# Patient Record
Sex: MALE | Race: OTHER | HISPANIC OR LATINO | Employment: FULL TIME | URBAN - METROPOLITAN AREA
[De-identification: names, ages, dates, MRNs, and addresses within clinical notes are randomized per-mention and may not be internally consistent; named-entity substitution may affect disease eponyms.]

---

## 2021-01-25 ENCOUNTER — HOSPITAL ENCOUNTER (EMERGENCY)
Facility: HOSPITAL | Age: 51
Discharge: HOME | End: 2021-01-25
Attending: EMERGENCY MEDICINE | Admitting: EMERGENCY MEDICINE
Payer: COMMERCIAL

## 2021-01-25 VITALS
HEART RATE: 79 BPM | DIASTOLIC BLOOD PRESSURE: 62 MMHG | OXYGEN SATURATION: 99 % | SYSTOLIC BLOOD PRESSURE: 132 MMHG | RESPIRATION RATE: 18 BRPM | TEMPERATURE: 97.4 F

## 2021-01-25 DIAGNOSIS — Z20.822 PERSON UNDER INVESTIGATION FOR COVID-19: Primary | ICD-10-CM

## 2021-01-25 LAB
FLUAV RNA RESP QL NAA+PROBE: NEGATIVE
FLUBV RNA RESP QL NAA+PROBE: NEGATIVE
RSV RNA RESP QL NAA+PROBE: NEGATIVE
SARS-COV-2 RNA RESP QL NAA+PROBE: POSITIVE

## 2021-01-25 PROCEDURE — 0241U HB NFCT DS VIR RESP RNA 4 TRGT: CPT | Performed by: PHYSICIAN ASSISTANT

## 2021-01-25 PROCEDURE — 99284 EMERGENCY DEPT VISIT MOD MDM: CPT | Performed by: PHYSICIAN ASSISTANT

## 2021-01-25 PROCEDURE — 99283 EMERGENCY DEPT VISIT LOW MDM: CPT

## 2021-01-25 NOTE — Clinical Note
Raegan Coyne was seen and treated in our emergency department on 1/25/2021  Diagnosis:     Shea Vazquez    He may return on this date: You may not return to work until you get your results and are cleared     If you have any questions or concerns, please don't hesitate to call        Xavier Cam PA-C    ______________________________           _______________          _______________  Hospital Representative                              Date                                Time

## 2021-01-25 NOTE — DISCHARGE INSTRUCTIONS
Return to the ER with any new or worsening of symptoms such as but not limited to difficulty breathing, dizziness, chest pain, persistent fevers, or any other concerning symptoms    You may take over the counter vitamins such as vitamin D, vitamin C, zinc, and multivitamins per instructions    You must self quarantine until you get your results and are cleared    Thank you for allowing us to be part of your care today

## 2021-01-25 NOTE — ED PROVIDER NOTES
History  Chief Complaint   Patient presents with    Medical Problem     Patient states they were exposed to someone that positive for COVID     Patient presents to the ER for evaluation following a COVID exposure  Patient states that this morning, someone that he lives with was notified that they tested positive for COVID  Patient states that he has some mild sinus congestion however denies any other symptoms  Denies any past medical history  Denies taking any medication PTA  Denies any fevers, cough, shortness of breath, chest pain, abdominal pain, nausea, vomiting, diarrhea, change in taste or smell, or any other concerning symptoms  None       No past medical history on file  No past surgical history on file  No family history on file  I have reviewed and agree with the history as documented  E-Cigarette/Vaping    E-Cigarette Use Never User      E-Cigarette/Vaping Substances     Social History     Tobacco Use    Smoking status: Never Smoker   Substance Use Topics    Alcohol use: Not on file    Drug use: Not on file       Review of Systems   Constitutional: Negative for chills and fever  HENT: Positive for congestion  Negative for sore throat  Respiratory: Negative for cough and shortness of breath  Cardiovascular: Negative for chest pain  Gastrointestinal: Negative for abdominal pain, diarrhea, nausea and vomiting  Genitourinary: Negative for dysuria  Musculoskeletal: Negative for back pain  Skin: Negative for rash  Neurological: Negative for headaches  All other systems reviewed and are negative  Physical Exam  Physical Exam  Constitutional:       General: He is not in acute distress  Appearance: He is well-developed  HENT:      Head: Normocephalic and atraumatic  Nose: Nose normal    Eyes:      Conjunctiva/sclera: Conjunctivae normal    Neck:      Musculoskeletal: Normal range of motion  Cardiovascular:      Rate and Rhythm: Normal rate  Pulmonary:      Effort: Pulmonary effort is normal  No respiratory distress  Breath sounds: Normal breath sounds  No stridor  No wheezing, rhonchi or rales  Musculoskeletal: Normal range of motion  Skin:     General: Skin is warm  Capillary Refill: Capillary refill takes less than 2 seconds  Neurological:      Mental Status: He is alert and oriented to person, place, and time  Vital Signs  ED Triage Vitals [01/25/21 1220]   Temperature Pulse Respirations Blood Pressure SpO2   (!) 97 4 °F (36 3 °C) 79 18 132/62 99 %      Temp Source Heart Rate Source Patient Position - Orthostatic VS BP Location FiO2 (%)   Tympanic Monitor Sitting Left arm --      Pain Score       --           Vitals:    01/25/21 1220   BP: 132/62   Pulse: 79   Patient Position - Orthostatic VS: Sitting         Visual Acuity      ED Medications  Medications - No data to display    Diagnostic Studies  Results Reviewed     Procedure Component Value Units Date/Time    COVID19, Influenza A/B, RSV PCR, [facility] [[id number]]  (Abnormal) Collected: 01/25/21 1254    Lab Status: Final result Specimen: Nares from Nasopharyngeal Swab Updated: 01/25/21 1412     SARS-CoV-2 Positive     INFLUENZA A PCR Negative     INFLUENZA B PCR Negative     RSV PCR Negative    Narrative: This test has been authorized by FDA under an EUA (Emergency Use Assay) for use by authorized laboratories  Clinical caution and judgement should be used with the interpretation of these results with consideration of the clinical impression and other laboratory testing  Testing reported as "Positive" or "Negative" has been proven to be accurate according to standard laboratory validation requirements  All testing is performed with control materials showing appropriate reactivity at standard intervals                   No orders to display              Procedures  Procedures         ED Course  ED Course as of Jan 25 1512   Mon Jan 25, 2021   1228 Blood Pressure: 132/62   1228 Temperature(!): 97 4 °F (36 3 °C)   1228 Pulse: 79   1228 Respirations: 18   1228 SpO2: 99 %                                           MDM     Patient well appearing and in no acute distress  Discussed with patient that he must quarantine until he gets his results and is cleared  Discussed symptomatic treatment and strict return instructions  Patient in no acute distress throughout ER stay  Vitals stable and reassuring  Patient stable for discharge at this time  Reviewed plan with patient/family  Reviewed red flag symptoms and strict return instructions  Patient/family voiced understanding and agreement to plan  Patient/family had opportunity to ask questions and all questions were answered at bedside  Disposition  Final diagnoses:   Person under investigation for COVID-19     Time reflects when diagnosis was documented in both MDM as applicable and the Disposition within this note     Time User Action Codes Description Comment    1/25/2021 12:47 PM Kevin Phan [[id number]] Person under investigation for COVID-19       ED Disposition     ED Disposition Condition Date/Time Comment    Discharge Stable Mon Jan 25, 2021 12:47 PM Dave Goodwin discharge to home/self care  Follow-up Information     Follow up With Specialties Details Why Contact Info Additional [address] Emergency Department Emergency Medicine  If symptoms worsen [address]  [address] [place] [address] Emergency Department, [address], [facility] [address]    [address]              There are no discharge medications for this patient  No discharge procedures on file      PDMP Review     None          ED Provider  Electronically Signed by           Zeenat Gonzalez PA-C  01/25/21 3640

## 2021-07-28 ENCOUNTER — HOSPITAL ENCOUNTER (EMERGENCY)
Facility: HOSPITAL | Age: 51
Discharge: HOME | End: 2021-07-28
Attending: EMERGENCY MEDICINE
Payer: COMMERCIAL

## 2021-07-28 VITALS
OXYGEN SATURATION: 97 % | SYSTOLIC BLOOD PRESSURE: 151 MMHG | WEIGHT: 230 LBS | DIASTOLIC BLOOD PRESSURE: 91 MMHG | TEMPERATURE: 98.6 F | RESPIRATION RATE: 18 BRPM | HEART RATE: 77 BPM

## 2021-07-28 DIAGNOSIS — M79.604 LOW BACK PAIN RADIATING TO RIGHT LOWER EXTREMITY: Primary | ICD-10-CM

## 2021-07-28 DIAGNOSIS — M54.50 LOW BACK PAIN RADIATING TO RIGHT LOWER EXTREMITY: Primary | ICD-10-CM

## 2021-07-28 LAB
BILIRUB UR QL STRIP: NEGATIVE
CLARITY UR: CLEAR
COLOR UR: YELLOW
GLUCOSE UR STRIP-MCNC: NEGATIVE MG/DL
HGB UR QL STRIP.AUTO: NEGATIVE
KETONES UR STRIP-MCNC: NEGATIVE MG/DL
LEUKOCYTE ESTERASE UR QL STRIP: NEGATIVE
NITRITE UR QL STRIP: NEGATIVE
PH UR STRIP.AUTO: 5.5 [PH] (ref 4.5–8)
PROT UR STRIP-MCNC: NEGATIVE MG/DL
SP GR UR STRIP.AUTO: >=1.03 (ref 1–1.03)
UROBILINOGEN UR QL STRIP.AUTO: 0.2 E.U./DL

## 2021-07-28 PROCEDURE — 96372 THER/PROPH/DIAG INJ SC/IM: CPT

## 2021-07-28 PROCEDURE — 99284 EMERGENCY DEPT VISIT MOD MDM: CPT | Performed by: EMERGENCY MEDICINE

## 2021-07-28 PROCEDURE — 81003 URINALYSIS AUTO W/O SCOPE: CPT

## 2021-07-28 PROCEDURE — 99284 EMERGENCY DEPT VISIT MOD MDM: CPT

## 2021-07-28 RX ORDER — LIDOCAINE 50 MG/G
1 PATCH TOPICAL DAILY
Qty: 10 PATCH | Refills: 0 | Status: SHIPPED | OUTPATIENT
Start: 2021-07-28 | End: 2022-06-22

## 2021-07-28 RX ORDER — LIDOCAINE 50 MG/G
1 PATCH TOPICAL ONCE
Status: DISCONTINUED | OUTPATIENT
Start: 2021-07-28 | End: 2021-07-28 | Stop reason: HOSPADM

## 2021-07-28 RX ORDER — KETOROLAC TROMETHAMINE 30 MG/ML
15 INJECTION, SOLUTION INTRAMUSCULAR; INTRAVENOUS ONCE
Status: COMPLETED | OUTPATIENT
Start: 2021-07-28 | End: 2021-07-28

## 2021-07-28 RX ORDER — ACETAMINOPHEN 325 MG/1
975 TABLET ORAL ONCE
Status: COMPLETED | OUTPATIENT
Start: 2021-07-28 | End: 2021-07-28

## 2021-07-28 RX ADMIN — ACETAMINOPHEN 975 MG: 325 TABLET, FILM COATED ORAL at 13:48

## 2021-07-28 RX ADMIN — KETOROLAC TROMETHAMINE 15 MG: 30 INJECTION, SOLUTION INTRAMUSCULAR; INTRAVENOUS at 13:48

## 2021-07-28 RX ADMIN — LIDOCAINE 1 PATCH: 50 PATCH TOPICAL at 13:48

## 2021-07-28 NOTE — ED ATTENDING ATTESTATION
7/28/2021  I, Sona Fisher MD, saw and evaluated the patient  I have discussed the patient with the resident/non-physician practitioner and agree with the resident's/non-physician practitioner's findings, Plan of Care, and MDM as documented in the resident's/non-physician practitioner's note, except where noted  All available labs and Radiology studies were reviewed  I was present for key portions of any procedure(s) performed by the resident/non-physician practitioner and I was immediately available to provide assistance  At this point I agree with the current assessment done in the Emergency Department  I have conducted an independent evaluation of this patient a history and physical is as follows:   The patient presents for evaluation of right-sided lower back pain that started about 4 days ago no known injury he does work lifting things  Patient has no abdominal pain no fever no chills no weight loss no bowel or bladder difficulty no weakness in the legs no history of IV drug abuse no history of cancer no night sweats  No urinary symptoms no dysuria no hematuria  ROS:    no fever,   no IVDA,    no cancer history,   no abd pain,  no radiation of pain,     no weakness in legs,  no numbness,   no parathesias in groin or perineal area ,    no bowel or bladder difficulty,   Exam:  Const:  Well appearing NAD     Neck:  supple, no JVD    Lungs:  clear    Heart:  RRR no m/g/r    Abd:   soft nt nd pos bs  no pulsitle mass, no bruits,  no palpable bladder      Back:    no midline tenderness        tender in paraspinal musculatrue  right and right SI area    worse to bend and twist       Skin:     no skin, rash warm and dry  Neuro:   motor 5/5  all muscle groups ,  sensory intact,  DTRs normal and symmetric     straight leg rasing  negative   Gait normal able  to ambulate  Toe heel walking  normal   Impression :   Musculoskeletal Back Pain                 ED Course         Critical Care Time  Procedures

## 2021-07-28 NOTE — DISCHARGE INSTRUCTIONS
You were seen for right lower back pain  You were given pain medicine and a urine test, which was normal  Your pain is likely musculoskeletal and can be treated with Advil and Tylenol as directed on the bottle  You can follow up with your primary care doctor in a week
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
## 2021-11-02 ENCOUNTER — HOSPITAL ENCOUNTER (EMERGENCY)
Facility: HOSPITAL | Age: 51
Discharge: HOME | End: 2021-11-02
Attending: EMERGENCY MEDICINE
Payer: COMMERCIAL

## 2021-11-02 VITALS
TEMPERATURE: 98.6 F | HEART RATE: 72 BPM | OXYGEN SATURATION: 94 % | DIASTOLIC BLOOD PRESSURE: 89 MMHG | RESPIRATION RATE: 16 BRPM | WEIGHT: 220 LBS | BODY MASS INDEX: 37.56 KG/M2 | HEIGHT: 64 IN | SYSTOLIC BLOOD PRESSURE: 151 MMHG

## 2021-11-02 DIAGNOSIS — M79.604 LOW BACK PAIN RADIATING TO RIGHT LOWER EXTREMITY: Primary | ICD-10-CM

## 2021-11-02 DIAGNOSIS — M54.50 LOW BACK PAIN RADIATING TO RIGHT LOWER EXTREMITY: Primary | ICD-10-CM

## 2021-11-02 LAB
BACTERIA UR QL AUTO: ABNORMAL /HPF
BILIRUB UR QL STRIP: NEGATIVE
CLARITY UR: CLEAR
COLOR UR: YELLOW
GLUCOSE UR STRIP-MCNC: NEGATIVE MG/DL
HGB UR QL STRIP.AUTO: ABNORMAL
HYALINE CASTS #/AREA URNS LPF: ABNORMAL /LPF
KETONES UR STRIP-MCNC: NEGATIVE MG/DL
LEUKOCYTE ESTERASE UR QL STRIP: NEGATIVE
NITRITE UR QL STRIP: NEGATIVE
NON-SQ EPI CELLS URNS QL MICRO: ABNORMAL /HPF
PH UR STRIP.AUTO: 6.5 [PH] (ref 4.5–8)
PROT UR STRIP-MCNC: NEGATIVE MG/DL
RBC #/AREA URNS AUTO: ABNORMAL /HPF
SP GR UR STRIP.AUTO: 1.02 (ref 1–1.03)
UROBILINOGEN UR QL STRIP.AUTO: 0.2 E.U./DL
WBC #/AREA URNS AUTO: ABNORMAL /HPF

## 2021-11-02 PROCEDURE — 99284 EMERGENCY DEPT VISIT MOD MDM: CPT

## 2021-11-02 PROCEDURE — 81001 URINALYSIS AUTO W/SCOPE: CPT

## 2021-11-02 PROCEDURE — 99284 EMERGENCY DEPT VISIT MOD MDM: CPT | Performed by: EMERGENCY MEDICINE

## 2021-11-02 PROCEDURE — 96374 THER/PROPH/DIAG INJ IV PUSH: CPT

## 2021-11-02 RX ORDER — LIDOCAINE 50 MG/G
1 PATCH TOPICAL ONCE
Status: DISCONTINUED | OUTPATIENT
Start: 2021-11-02 | End: 2021-11-02 | Stop reason: HOSPADM

## 2021-11-02 RX ORDER — CYCLOBENZAPRINE HCL 10 MG
10 TABLET ORAL 2 TIMES DAILY PRN
Qty: 20 TABLET | Refills: 0 | Status: SHIPPED | OUTPATIENT
Start: 2021-11-02 | End: 2022-06-22

## 2021-11-02 RX ORDER — CYCLOBENZAPRINE HCL 10 MG
10 TABLET ORAL ONCE
Status: COMPLETED | OUTPATIENT
Start: 2021-11-02 | End: 2021-11-02

## 2021-11-02 RX ORDER — ACETAMINOPHEN 325 MG/1
975 TABLET ORAL ONCE
Status: COMPLETED | OUTPATIENT
Start: 2021-11-02 | End: 2021-11-02

## 2021-11-02 RX ORDER — KETOROLAC TROMETHAMINE 30 MG/ML
15 INJECTION, SOLUTION INTRAMUSCULAR; INTRAVENOUS ONCE
Status: COMPLETED | OUTPATIENT
Start: 2021-11-02 | End: 2021-11-02

## 2021-11-02 RX ADMIN — LIDOCAINE 5% 1 PATCH: 700 PATCH TOPICAL at 09:37

## 2021-11-02 RX ADMIN — ACETAMINOPHEN 975 MG: 325 TABLET, FILM COATED ORAL at 09:37

## 2021-11-02 RX ADMIN — KETOROLAC TROMETHAMINE 15 MG: 30 INJECTION, SOLUTION INTRAMUSCULAR at 09:38

## 2021-11-02 RX ADMIN — CYCLOBENZAPRINE HYDROCHLORIDE 10 MG: 10 TABLET, FILM COATED ORAL at 11:22

## 2021-11-10 ENCOUNTER — HOSPITAL ENCOUNTER (EMERGENCY)
Facility: HOSPITAL | Age: 51
Discharge: HOME | End: 2021-11-10
Attending: EMERGENCY MEDICINE
Payer: COMMERCIAL

## 2021-11-10 VITALS
OXYGEN SATURATION: 96 % | RESPIRATION RATE: 16 BRPM | SYSTOLIC BLOOD PRESSURE: 174 MMHG | HEART RATE: 85 BPM | TEMPERATURE: 98 F | DIASTOLIC BLOOD PRESSURE: 104 MMHG

## 2021-11-10 DIAGNOSIS — M54.41 ACUTE RIGHT-SIDED LOW BACK PAIN WITH RIGHT-SIDED SCIATICA: Primary | ICD-10-CM

## 2021-11-10 PROCEDURE — 96372 THER/PROPH/DIAG INJ SC/IM: CPT

## 2021-11-10 PROCEDURE — 99284 EMERGENCY DEPT VISIT MOD MDM: CPT | Performed by: EMERGENCY MEDICINE

## 2021-11-10 PROCEDURE — 99283 EMERGENCY DEPT VISIT LOW MDM: CPT

## 2021-11-10 RX ORDER — ACETAMINOPHEN 325 MG/1
975 TABLET ORAL ONCE
Status: COMPLETED | OUTPATIENT
Start: 2021-11-10 | End: 2021-11-10

## 2021-11-10 RX ORDER — NAPROXEN 500 MG/1
500 TABLET ORAL 2 TIMES DAILY WITH MEALS
Qty: 14 TABLET | Refills: 0 | Status: SHIPPED | OUTPATIENT
Start: 2021-11-10 | End: 2021-11-20

## 2021-11-10 RX ORDER — LIDOCAINE 50 MG/G
1 PATCH TOPICAL ONCE
Status: DISCONTINUED | OUTPATIENT
Start: 2021-11-10 | End: 2021-11-10 | Stop reason: HOSPADM

## 2021-11-10 RX ORDER — KETOROLAC TROMETHAMINE 30 MG/ML
15 INJECTION, SOLUTION INTRAMUSCULAR; INTRAVENOUS ONCE
Status: COMPLETED | OUTPATIENT
Start: 2021-11-10 | End: 2021-11-10

## 2021-11-10 RX ORDER — LIDOCAINE 50 MG/G
1 PATCH TOPICAL DAILY
Qty: 7 PATCH | Refills: 0 | Status: SHIPPED | OUTPATIENT
Start: 2021-11-10 | End: 2021-11-17

## 2021-11-10 RX ADMIN — KETOROLAC TROMETHAMINE 15 MG: 30 INJECTION, SOLUTION INTRAMUSCULAR at 12:54

## 2021-11-10 RX ADMIN — ACETAMINOPHEN 975 MG: 325 TABLET ORAL at 12:54

## 2021-11-10 RX ADMIN — LIDOCAINE 5% 1 PATCH: 700 PATCH TOPICAL at 12:54

## 2021-11-11 ENCOUNTER — TELEPHONE (OUTPATIENT)
Dept: PHYSICAL THERAPY | Facility: OTHER | Age: 51
End: 2021-11-11

## 2021-11-20 ENCOUNTER — HOSPITAL ENCOUNTER (EMERGENCY)
Facility: HOSPITAL | Age: 51
Discharge: HOME | End: 2021-11-20
Attending: EMERGENCY MEDICINE
Payer: COMMERCIAL

## 2021-11-20 ENCOUNTER — APPOINTMENT (EMERGENCY)
Dept: RADIOLOGY | Facility: HOSPITAL | Age: 51
End: 2021-11-20
Payer: COMMERCIAL

## 2021-11-20 VITALS
HEART RATE: 89 BPM | RESPIRATION RATE: 20 BRPM | BODY MASS INDEX: 38.62 KG/M2 | SYSTOLIC BLOOD PRESSURE: 120 MMHG | DIASTOLIC BLOOD PRESSURE: 79 MMHG | TEMPERATURE: 96.8 F | OXYGEN SATURATION: 96 % | WEIGHT: 225 LBS

## 2021-11-20 DIAGNOSIS — R11.10 VOMITING: ICD-10-CM

## 2021-11-20 DIAGNOSIS — N20.0 KIDNEY STONE: ICD-10-CM

## 2021-11-20 DIAGNOSIS — K57.90 DIVERTICULOSIS: Primary | ICD-10-CM

## 2021-11-20 LAB
ALBUMIN SERPL BCP-MCNC: 3.9 G/DL (ref 3.5–5)
ALP SERPL-CCNC: 140 U/L (ref 46–116)
ALT SERPL W P-5'-P-CCNC: 28 U/L (ref 12–78)
ANION GAP SERPL CALCULATED.3IONS-SCNC: 5 MMOL/L (ref 4–13)
AST SERPL W P-5'-P-CCNC: 16 U/L (ref 5–45)
ATRIAL RATE: 67 BPM
BASOPHILS # BLD AUTO: 0.03 THOUSANDS/ΜL (ref 0–0.1)
BASOPHILS NFR BLD AUTO: 1 % (ref 0–1)
BILIRUB SERPL-MCNC: 0.94 MG/DL (ref 0.2–1)
BUN SERPL-MCNC: 14 MG/DL (ref 5–25)
CALCIUM SERPL-MCNC: 9.5 MG/DL (ref 8.3–10.1)
CHLORIDE SERPL-SCNC: 104 MMOL/L (ref 100–108)
CO2 SERPL-SCNC: 28 MMOL/L (ref 21–32)
CREAT SERPL-MCNC: 1.07 MG/DL (ref 0.6–1.3)
EOSINOPHIL # BLD AUTO: 0 THOUSAND/ΜL (ref 0–0.61)
EOSINOPHIL NFR BLD AUTO: 0 % (ref 0–6)
ERYTHROCYTE [DISTWIDTH] IN BLOOD BY AUTOMATED COUNT: 14 % (ref 11.6–15.1)
GFR SERPL CREATININE-BSD FRML MDRD: 81 ML/MIN/1.73SQ M
GLUCOSE SERPL-MCNC: 197 MG/DL (ref 65–140)
GLUCOSE SERPL-MCNC: 229 MG/DL (ref 65–140)
HCT VFR BLD AUTO: 49.3 % (ref 36.5–49.3)
HGB BLD-MCNC: 16.2 G/DL (ref 12–17)
IMM GRANULOCYTES # BLD AUTO: 0.03 THOUSAND/UL (ref 0–0.2)
IMM GRANULOCYTES NFR BLD AUTO: 1 % (ref 0–2)
LIPASE SERPL-CCNC: 72 U/L (ref 73–393)
LYMPHOCYTES # BLD AUTO: 0.51 THOUSANDS/ΜL (ref 0.6–4.47)
LYMPHOCYTES NFR BLD AUTO: 9 % (ref 14–44)
MCH RBC QN AUTO: 27.5 PG (ref 26.8–34.3)
MCHC RBC AUTO-ENTMCNC: 32.9 G/DL (ref 31.4–37.4)
MCV RBC AUTO: 84 FL (ref 82–98)
MONOCYTES # BLD AUTO: 0.31 THOUSAND/ΜL (ref 0.17–1.22)
MONOCYTES NFR BLD AUTO: 5 % (ref 4–12)
NEUTROPHILS # BLD AUTO: 5.09 THOUSANDS/ΜL (ref 1.85–7.62)
NEUTS SEG NFR BLD AUTO: 84 % (ref 43–75)
NRBC BLD AUTO-RTO: 0 /100 WBCS
P AXIS: 72 DEGREES
PLATELET # BLD AUTO: 380 THOUSANDS/UL (ref 149–390)
PMV BLD AUTO: 10.5 FL (ref 8.9–12.7)
POTASSIUM SERPL-SCNC: 3.3 MMOL/L (ref 3.5–5.3)
PR INTERVAL: 152 MS
PROT SERPL-MCNC: 8.3 G/DL (ref 6.4–8.2)
QRS AXIS: 22 DEGREES
QRSD INTERVAL: 90 MS
QT INTERVAL: 382 MS
QTC INTERVAL: 403 MS
RBC # BLD AUTO: 5.9 MILLION/UL (ref 3.88–5.62)
SODIUM SERPL-SCNC: 137 MMOL/L (ref 136–145)
T WAVE AXIS: -5 DEGREES
VENTRICULAR RATE: 67 BPM
WBC # BLD AUTO: 5.97 THOUSAND/UL (ref 4.31–10.16)

## 2021-11-20 PROCEDURE — 96365 THER/PROPH/DIAG IV INF INIT: CPT

## 2021-11-20 PROCEDURE — 93010 ELECTROCARDIOGRAM REPORT: CPT | Performed by: INTERNAL MEDICINE

## 2021-11-20 PROCEDURE — 99285 EMERGENCY DEPT VISIT HI MDM: CPT | Performed by: EMERGENCY MEDICINE

## 2021-11-20 PROCEDURE — 96366 THER/PROPH/DIAG IV INF ADDON: CPT

## 2021-11-20 PROCEDURE — 76705 ECHO EXAM OF ABDOMEN: CPT | Performed by: EMERGENCY MEDICINE

## 2021-11-20 PROCEDURE — 82948 REAGENT STRIP/BLOOD GLUCOSE: CPT

## 2021-11-20 PROCEDURE — 93005 ELECTROCARDIOGRAM TRACING: CPT

## 2021-11-20 PROCEDURE — 36415 COLL VENOUS BLD VENIPUNCTURE: CPT

## 2021-11-20 PROCEDURE — 85025 COMPLETE CBC W/AUTO DIFF WBC: CPT | Performed by: EMERGENCY MEDICINE

## 2021-11-20 PROCEDURE — 99284 EMERGENCY DEPT VISIT MOD MDM: CPT

## 2021-11-20 PROCEDURE — G1004 CDSM NDSC: HCPCS

## 2021-11-20 PROCEDURE — 96375 TX/PRO/DX INJ NEW DRUG ADDON: CPT

## 2021-11-20 PROCEDURE — 80053 COMPREHEN METABOLIC PANEL: CPT | Performed by: EMERGENCY MEDICINE

## 2021-11-20 PROCEDURE — 74177 CT ABD & PELVIS W/CONTRAST: CPT

## 2021-11-20 PROCEDURE — 83690 ASSAY OF LIPASE: CPT | Performed by: EMERGENCY MEDICINE

## 2021-11-20 RX ORDER — METOCLOPRAMIDE HYDROCHLORIDE 5 MG/ML
10 INJECTION INTRAMUSCULAR; INTRAVENOUS ONCE
Status: COMPLETED | OUTPATIENT
Start: 2021-11-20 | End: 2021-11-20

## 2021-11-20 RX ORDER — MAGNESIUM HYDROXIDE/ALUMINUM HYDROXICE/SIMETHICONE 120; 1200; 1200 MG/30ML; MG/30ML; MG/30ML
30 SUSPENSION ORAL ONCE
Status: COMPLETED | OUTPATIENT
Start: 2021-11-20 | End: 2021-11-20

## 2021-11-20 RX ORDER — ONDANSETRON 4 MG/1
4 TABLET, ORALLY DISINTEGRATING ORAL EVERY 6 HOURS PRN
Qty: 20 TABLET | Refills: 0 | Status: SHIPPED | OUTPATIENT
Start: 2021-11-20

## 2021-11-20 RX ORDER — ONDANSETRON 2 MG/ML
4 INJECTION INTRAMUSCULAR; INTRAVENOUS ONCE
Status: COMPLETED | OUTPATIENT
Start: 2021-11-20 | End: 2021-11-20

## 2021-11-20 RX ORDER — HYDROMORPHONE HCL/PF 1 MG/ML
1 SYRINGE (ML) INJECTION ONCE
Status: COMPLETED | OUTPATIENT
Start: 2021-11-20 | End: 2021-11-20

## 2021-11-20 RX ADMIN — ALUMINA, MAGNESIA, AND SIMETHICONE ORAL SUSPENSION REGULAR STRENGTH 30 ML: 1200; 1200; 120 SUSPENSION ORAL at 15:01

## 2021-11-20 RX ADMIN — HYDROMORPHONE HYDROCHLORIDE 1 MG: 1 INJECTION, SOLUTION INTRAMUSCULAR; INTRAVENOUS; SUBCUTANEOUS at 16:33

## 2021-11-20 RX ADMIN — FAMOTIDINE 20 MG: 10 INJECTION INTRAVENOUS at 15:01

## 2021-11-20 RX ADMIN — SODIUM CHLORIDE, SODIUM LACTATE, POTASSIUM CHLORIDE, AND CALCIUM CHLORIDE 1000 ML: .6; .31; .03; .02 INJECTION, SOLUTION INTRAVENOUS at 15:05

## 2021-11-20 RX ADMIN — IOHEXOL 100 ML: 350 INJECTION, SOLUTION INTRAVENOUS at 16:48

## 2021-11-20 RX ADMIN — METOCLOPRAMIDE HYDROCHLORIDE 10 MG: 5 INJECTION INTRAMUSCULAR; INTRAVENOUS at 15:03

## 2021-11-20 RX ADMIN — ONDANSETRON 4 MG: 2 INJECTION INTRAMUSCULAR; INTRAVENOUS at 14:06

## 2022-02-18 ENCOUNTER — APPOINTMENT (EMERGENCY)
Dept: RADIOLOGY | Facility: HOSPITAL | Age: 52
End: 2022-02-18
Payer: COMMERCIAL

## 2022-02-18 ENCOUNTER — HOSPITAL ENCOUNTER (EMERGENCY)
Facility: HOSPITAL | Age: 52
Discharge: HOME | End: 2022-02-18
Attending: EMERGENCY MEDICINE
Payer: COMMERCIAL

## 2022-02-18 VITALS
OXYGEN SATURATION: 92 % | DIASTOLIC BLOOD PRESSURE: 67 MMHG | WEIGHT: 227.07 LBS | RESPIRATION RATE: 18 BRPM | HEART RATE: 58 BPM | TEMPERATURE: 98.2 F | HEIGHT: 64 IN | BODY MASS INDEX: 38.77 KG/M2 | SYSTOLIC BLOOD PRESSURE: 125 MMHG

## 2022-02-18 DIAGNOSIS — R10.13 EPIGASTRIC PAIN: Primary | ICD-10-CM

## 2022-02-18 DIAGNOSIS — R11.2 NAUSEA & VOMITING: ICD-10-CM

## 2022-02-18 LAB
2HR DELTA HS TROPONIN: -1 NG/L
ALBUMIN SERPL BCP-MCNC: 4.3 G/DL (ref 3.5–5)
ALP SERPL-CCNC: 107 U/L (ref 46–116)
ALT SERPL W P-5'-P-CCNC: 29 U/L (ref 12–78)
ANION GAP SERPL CALCULATED.3IONS-SCNC: 6 MMOL/L (ref 4–13)
AST SERPL W P-5'-P-CCNC: 23 U/L (ref 5–45)
ATRIAL RATE: 65 BPM
BASOPHILS # BLD AUTO: 0.05 THOUSANDS/ΜL (ref 0–0.1)
BASOPHILS NFR BLD AUTO: 1 % (ref 0–1)
BILIRUB SERPL-MCNC: 1.05 MG/DL (ref 0.2–1)
BUN SERPL-MCNC: 14 MG/DL (ref 5–25)
CALCIUM SERPL-MCNC: 10.2 MG/DL (ref 8.3–10.1)
CARDIAC TROPONIN I PNL SERPL HS: 10 NG/L
CARDIAC TROPONIN I PNL SERPL HS: 11 NG/L
CHLORIDE SERPL-SCNC: 104 MMOL/L (ref 100–108)
CO2 SERPL-SCNC: 27 MMOL/L (ref 21–32)
CREAT SERPL-MCNC: 1.01 MG/DL (ref 0.6–1.3)
EOSINOPHIL # BLD AUTO: 0.03 THOUSAND/ΜL (ref 0–0.61)
EOSINOPHIL NFR BLD AUTO: 0 % (ref 0–6)
ERYTHROCYTE [DISTWIDTH] IN BLOOD BY AUTOMATED COUNT: 14.2 % (ref 11.6–15.1)
FLUAV RNA RESP QL NAA+PROBE: NEGATIVE
FLUBV RNA RESP QL NAA+PROBE: NEGATIVE
GFR SERPL CREATININE-BSD FRML MDRD: 86 ML/MIN/1.73SQ M
GLUCOSE SERPL-MCNC: 180 MG/DL (ref 65–140)
HCT VFR BLD AUTO: 52.5 % (ref 36.5–49.3)
HGB BLD-MCNC: 17 G/DL (ref 12–17)
IMM GRANULOCYTES # BLD AUTO: 0.02 THOUSAND/UL (ref 0–0.2)
IMM GRANULOCYTES NFR BLD AUTO: 0 % (ref 0–2)
LIPASE SERPL-CCNC: 176 U/L (ref 73–393)
LYMPHOCYTES # BLD AUTO: 1.59 THOUSANDS/ΜL (ref 0.6–4.47)
LYMPHOCYTES NFR BLD AUTO: 23 % (ref 14–44)
MCH RBC QN AUTO: 27.5 PG (ref 26.8–34.3)
MCHC RBC AUTO-ENTMCNC: 32.4 G/DL (ref 31.4–37.4)
MCV RBC AUTO: 85 FL (ref 82–98)
MONOCYTES # BLD AUTO: 0.41 THOUSAND/ΜL (ref 0.17–1.22)
MONOCYTES NFR BLD AUTO: 6 % (ref 4–12)
NEUTROPHILS # BLD AUTO: 4.87 THOUSANDS/ΜL (ref 1.85–7.62)
NEUTS SEG NFR BLD AUTO: 70 % (ref 43–75)
NRBC BLD AUTO-RTO: 0 /100 WBCS
P AXIS: 48 DEGREES
PLATELET # BLD AUTO: 428 THOUSANDS/UL (ref 149–390)
PMV BLD AUTO: 10.4 FL (ref 8.9–12.7)
POTASSIUM SERPL-SCNC: 3.5 MMOL/L (ref 3.5–5.3)
PR INTERVAL: 164 MS
PROT SERPL-MCNC: 8.9 G/DL (ref 6.4–8.2)
QRS AXIS: 37 DEGREES
QRSD INTERVAL: 82 MS
QT INTERVAL: 392 MS
QTC INTERVAL: 407 MS
RBC # BLD AUTO: 6.18 MILLION/UL (ref 3.88–5.62)
RSV RNA RESP QL NAA+PROBE: NEGATIVE
SARS-COV-2 RNA RESP QL NAA+PROBE: NEGATIVE
SODIUM SERPL-SCNC: 137 MMOL/L (ref 136–145)
T WAVE AXIS: 67 DEGREES
VENTRICULAR RATE: 65 BPM
WBC # BLD AUTO: 6.97 THOUSAND/UL (ref 4.31–10.16)

## 2022-02-18 PROCEDURE — 85025 COMPLETE CBC W/AUTO DIFF WBC: CPT | Performed by: STUDENT IN AN ORGANIZED HEALTH CARE EDUCATION/TRAINING PROGRAM

## 2022-02-18 PROCEDURE — 96375 TX/PRO/DX INJ NEW DRUG ADDON: CPT

## 2022-02-18 PROCEDURE — G1004 CDSM NDSC: HCPCS

## 2022-02-18 PROCEDURE — 93005 ELECTROCARDIOGRAM TRACING: CPT

## 2022-02-18 PROCEDURE — 96366 THER/PROPH/DIAG IV INF ADDON: CPT

## 2022-02-18 PROCEDURE — 71275 CT ANGIOGRAPHY CHEST: CPT

## 2022-02-18 PROCEDURE — 96365 THER/PROPH/DIAG IV INF INIT: CPT

## 2022-02-18 PROCEDURE — 0241U HB NFCT DS VIR RESP RNA 4 TRGT: CPT | Performed by: STUDENT IN AN ORGANIZED HEALTH CARE EDUCATION/TRAINING PROGRAM

## 2022-02-18 PROCEDURE — 93010 ELECTROCARDIOGRAM REPORT: CPT | Performed by: INTERNAL MEDICINE

## 2022-02-18 PROCEDURE — 36415 COLL VENOUS BLD VENIPUNCTURE: CPT | Performed by: STUDENT IN AN ORGANIZED HEALTH CARE EDUCATION/TRAINING PROGRAM

## 2022-02-18 PROCEDURE — 84484 ASSAY OF TROPONIN QUANT: CPT | Performed by: STUDENT IN AN ORGANIZED HEALTH CARE EDUCATION/TRAINING PROGRAM

## 2022-02-18 PROCEDURE — 99285 EMERGENCY DEPT VISIT HI MDM: CPT | Performed by: EMERGENCY MEDICINE

## 2022-02-18 PROCEDURE — 80053 COMPREHEN METABOLIC PANEL: CPT | Performed by: STUDENT IN AN ORGANIZED HEALTH CARE EDUCATION/TRAINING PROGRAM

## 2022-02-18 PROCEDURE — 99284 EMERGENCY DEPT VISIT MOD MDM: CPT

## 2022-02-18 PROCEDURE — 83690 ASSAY OF LIPASE: CPT | Performed by: STUDENT IN AN ORGANIZED HEALTH CARE EDUCATION/TRAINING PROGRAM

## 2022-02-18 PROCEDURE — 74174 CTA ABD&PLVS W/CONTRAST: CPT

## 2022-02-18 RX ORDER — SODIUM CHLORIDE, SODIUM GLUCONATE, SODIUM ACETATE, POTASSIUM CHLORIDE, MAGNESIUM CHLORIDE, SODIUM PHOSPHATE, DIBASIC, AND POTASSIUM PHOSPHATE .53; .5; .37; .037; .03; .012; .00082 G/100ML; G/100ML; G/100ML; G/100ML; G/100ML; G/100ML; G/100ML
1000 INJECTION, SOLUTION INTRAVENOUS ONCE
Status: COMPLETED | OUTPATIENT
Start: 2022-02-18 | End: 2022-02-18

## 2022-02-18 RX ORDER — FENTANYL CITRATE-0.9 % NACL/PF 10 MCG/ML
50 PLASTIC BAG, INJECTION (ML) INTRAVENOUS CONTINUOUS
Status: DISCONTINUED | OUTPATIENT
Start: 2022-02-18 | End: 2022-02-18

## 2022-02-18 RX ORDER — ONDANSETRON 2 MG/ML
4 INJECTION INTRAMUSCULAR; INTRAVENOUS ONCE
Status: COMPLETED | OUTPATIENT
Start: 2022-02-18 | End: 2022-02-18

## 2022-02-18 RX ORDER — ONDANSETRON 4 MG/1
4 TABLET, ORALLY DISINTEGRATING ORAL EVERY 6 HOURS PRN
Qty: 20 TABLET | Refills: 0 | Status: SHIPPED | OUTPATIENT
Start: 2022-02-18 | End: 2022-02-23

## 2022-02-18 RX ORDER — HYDROMORPHONE HCL/PF 1 MG/ML
1 SYRINGE (ML) INJECTION ONCE
Status: COMPLETED | OUTPATIENT
Start: 2022-02-18 | End: 2022-02-18

## 2022-02-18 RX ADMIN — ONDANSETRON 4 MG: 2 INJECTION INTRAMUSCULAR; INTRAVENOUS at 10:27

## 2022-02-18 RX ADMIN — IOHEXOL 100 ML: 350 INJECTION, SOLUTION INTRAVENOUS at 11:21

## 2022-02-18 RX ADMIN — HYDROMORPHONE HYDROCHLORIDE 1 MG: 1 INJECTION, SOLUTION INTRAMUSCULAR; INTRAVENOUS; SUBCUTANEOUS at 10:29

## 2022-02-18 RX ADMIN — SODIUM CHLORIDE, SODIUM GLUCONATE, SODIUM ACETATE, POTASSIUM CHLORIDE, MAGNESIUM CHLORIDE, SODIUM PHOSPHATE, DIBASIC, AND POTASSIUM PHOSPHATE 1000 ML: .53; .5; .37; .037; .03; .012; .00082 INJECTION, SOLUTION INTRAVENOUS at 10:27

## 2022-02-18 NOTE — DISCHARGE INSTRUCTIONS
Return to the ED if you have worsening nausea vomiting or belly pain despite using zofran and tylenol/ motrin at home

## 2022-02-18 NOTE — ED PROVIDER NOTES
History  Chief Complaint   Patient presents with    Vomiting     Pt reports sudden adominal pain and vomitting since 400am this am    Abdominal Pain     HPI  24-year-old male past medical history of hypertension and diabetes presents with the complaint of sudden onset epigastric abdominal pain and nausea vomiting  The symptoms started approximately 4:00 a m  This morning with persistent ever since  Cannot describe any palliative or exacerbating factors to this abdominal pain or nausea vomiting  He has not tried any medications for either  He denies any chest pain, shortness of breath, fevers, chills, diarrhea, constipation, lateralizing weakness, numbness and tingling in the extremities  He denies any drug or alcohol use  Prior to Admission Medications   Prescriptions Last Dose Informant Patient Reported? Taking?    Diclofenac Sodium (VOLTAREN) 1 %   No No   Sig: Apply 2 g topically 4 (four) times a day   amLODIPine (NORVASC) 10 mg tablet   Yes No   Sig: TAKE 1 TABLET BY MOUTH EVERY DAY   aspirin (ECOTRIN LOW STRENGTH) 81 mg EC tablet   Yes No   Sig: Take 81 mg by mouth   atorvastatin (LIPITOR) 40 mg tablet   Yes No   Sig: TAKE 1 TABLET BY MOUTH EVERY DAY   cyclobenzaprine (FLEXERIL) 10 mg tablet   No No   Sig: Take 1 tablet (10 mg total) by mouth 2 (two) times a day as needed for muscle spasms   glimepiride (AMARYL) 2 mg tablet   Yes No   Sig: Take 2 mg by mouth   hydrochlorothiazide (HYDRODIURIL) 25 mg tablet   Yes No   Sig: Take 25 mg by mouth   hydrocortisone 0 5 % cream   Yes No   Sig: Hydrocortisone (Hydrocortisone 0 5 % Cream 28 Gms)  28 35 GM TUBE   lidocaine (LIDODERM) 5 %   No No   Sig: Apply 1 patch topically daily Remove & Discard patch within 12 hours or as directed by MD   lidocaine (Lidoderm) 5 %   No No   Sig: Apply 1 patch topically daily for 7 days Remove & Discard patch within 12 hours or as directed by MD   lisinopril (ZESTRIL) 40 mg tablet   Yes No   Si mg   metFORMIN (GLUCOPHAGE) 500 mg tablet   Yes No   Si mg   naproxen (Naprosyn) 500 mg tablet   No No   Sig: Take 1 tablet (500 mg total) by mouth 2 (two) times a day with meals for 7 days   olmesartan (BENICAR) 20 mg tablet   Yes No   Sig: TAKE 1 TABLET BY MOUTH EVERY DAY   ondansetron (Zofran ODT) 4 mg disintegrating tablet   No No   Sig: Take 1 tablet (4 mg total) by mouth every 6 (six) hours as needed for nausea or vomiting   pantoprazole (PROTONIX) 40 mg tablet   Yes No   Sig: TAKE 1 TABLET BY MOUTH BEFORE BREAKFAST      Facility-Administered Medications: None       Past Medical History:   Diagnosis Date    Diabetes mellitus ([facility] [address] )        History reviewed  No pertinent surgical history  History reviewed  No pertinent family history  I have reviewed and agree with the history as documented  E-Cigarette/Vaping    E-Cigarette Use Never User      E-Cigarette/Vaping Substances     Social History     Tobacco Use    Smoking status: Never Smoker    Smokeless tobacco: Never Used   Vaping Use    Vaping Use: Never used   Substance Use Topics    Alcohol use: Not Currently    Drug use: Not Currently        Review of Systems   Constitutional: Negative for chills and fever  HENT: Negative for congestion, ear pain, rhinorrhea and sore throat  Eyes: Negative for pain  Respiratory: Negative for apnea, cough, choking, chest tightness, shortness of breath, wheezing and stridor  Cardiovascular: Negative for chest pain, palpitations and leg swelling  Gastrointestinal: Positive for abdominal pain, nausea and vomiting  Negative for constipation and diarrhea  Genitourinary: Negative for hematuria  Musculoskeletal: Negative for arthralgias and back pain  Skin: Negative for rash and wound  Neurological: Negative for dizziness  Psychiatric/Behavioral: Negative for agitation and hallucinations  All other systems reviewed and are negative        Physical Exam  ED Triage Vitals [22 0958]   Temperature Pulse Respirations Blood Pressure SpO2   98 2 °F (36 8 °C) 77 18 (!) 164/105 100 %      Temp Source Heart Rate Source Patient Position - Orthostatic VS BP Location FiO2 (%)   Oral Monitor Lying Right arm --      Pain Score       8             Orthostatic Vital Signs  Vitals:    02/18/22 1100 02/18/22 1130 02/18/22 1200 02/18/22 1330   BP: 146/76 151/81 110/70 125/67   Pulse: 66 68 70 58   Patient Position - Orthostatic VS: Lying Lying Lying Lying       Physical Exam  Vitals reviewed  Constitutional:       General: He is not in acute distress  Appearance: He is well-developed  HENT:      Head: Normocephalic and atraumatic  Right Ear: External ear normal       Left Ear: External ear normal       Nose: Nose normal  No congestion or rhinorrhea  Mouth/Throat:      Mouth: Mucous membranes are moist       Pharynx: No oropharyngeal exudate or posterior oropharyngeal erythema  Eyes:      General:         Right eye: No discharge  Left eye: No discharge  Extraocular Movements: Extraocular movements intact  Conjunctiva/sclera: Conjunctivae normal       Pupils: Pupils are equal, round, and reactive to light  Cardiovascular:      Rate and Rhythm: Normal rate and regular rhythm  Pulses: Normal pulses  Heart sounds: Normal heart sounds  Pulmonary:      Effort: Pulmonary effort is normal  No respiratory distress  Breath sounds: Normal breath sounds  No wheezing or rales  Abdominal:      Palpations: Abdomen is soft  Tenderness: There is abdominal tenderness in the epigastric area  Musculoskeletal:         General: No tenderness  Normal range of motion  Cervical back: Normal range of motion and neck supple  No rigidity  Skin:     General: Skin is warm  Findings: No erythema or rash  Neurological:      General: No focal deficit present  Mental Status: He is alert and oriented to person, place, and time  Cranial Nerves: No cranial nerve deficit        Sensory: No sensory deficit  Motor: No weakness  Coordination: Coordination normal    Psychiatric:         Mood and Affect: Mood normal          ED Medications  Medications   ondansetron (ZOFRAN) injection 4 mg (4 mg Intravenous Given 2/18/22 1027)   multi-electrolyte (ISOLYTE-S PH 7 4) bolus 1,000 mL (0 mL Intravenous Stopped 2/18/22 1401)   HYDROmorphone (DILAUDID) injection 1 mg (1 mg Intravenous Given 2/18/22 1029)   iohexol (OMNIPAQUE) 350 MG/ML injection (SINGLE-DOSE) 100 mL (100 mL Intravenous Given 2/18/22 1121)       Diagnostic Studies  Results Reviewed     Procedure Component Value Units Date/Time    HS Troponin I 2hr [[id number]]  (Normal) Collected: 02/18/22 1251    Lab Status: Final result Specimen: Blood from Arm, Right Updated: 02/18/22 1355     hs TnI 2hr 10 ng/L      Delta 2hr hsTnI -1 ng/L     HS Troponin I 4hr [[id number]]     Lab Status: No result Specimen: Blood     COVID/FLU/RSV [[id number]]  (Normal) Collected: 02/18/22 1028    Lab Status: Final result Specimen: Nares from Nose Updated: 02/18/22 1126     SARS-CoV-2 Negative     INFLUENZA A PCR Negative     INFLUENZA B PCR Negative     RSV PCR Negative    Narrative:      FOR PEDIATRIC PATIENTS - copy/paste COVID Guidelines URL to browser: [email or web address]    SARS-CoV-2 assay is a Nucleic Acid Amplification assay intended for the  qualitative detection of nucleic acid from SARS-CoV-2 in nasopharyngeal  swabs  Results are for the presumptive identification of SARS-CoV-2 RNA  Positive results are indicative of infection with SARS-CoV-2, the virus  causing COVID-19, but do not rule out bacterial infection or co-infection  with other viruses  Laboratories within the [place] and its  territories are required to report all positive results to the appropriate  public health authorities   Negative results do not preclude SARS-CoV-2  infection and should not be used as the sole basis for treatment or other  patient management decisions  Negative results must be combined with  clinical observations, patient history, and epidemiological information  This test has not been FDA cleared or approved  This test has been authorized by FDA under an Emergency Use Authorization  (EUA)  This test is only authorized for the duration of time the  declaration that circumstances exist justifying the authorization of the  emergency use of an in vitro diagnostic tests for detection of SARS-CoV-2  virus and/or diagnosis of COVID-19 infection under section 564(b)(1) of  the Act, 21 U  S C  215IQV-5(S)(8), unless the authorization is terminated  or revoked sooner  The test has been validated but independent review by FDA  and CLIA is pending  Test performed using [employer] GeneXpert: This RT-PCR assay targets N2,  a region unique to SARS-CoV-2  A conserved region in the E-gene was chosen  for pan-Sarbecovirus detection which includes SARS-CoV-2      HS Troponin 0hr (reflex protocol) [[id number]]  (Normal) Collected: 02/18/22 1029    Lab Status: Final result Specimen: Blood from Arm, Right Updated: 02/18/22 1113     hs TnI 0hr 11 ng/L     CMP [[id number]]  (Abnormal) Collected: 02/18/22 1028    Lab Status: Final result Specimen: Blood from Arm, Right Updated: 02/18/22 1101     Sodium 137 mmol/L      Potassium 3 5 mmol/L      Chloride 104 mmol/L      CO2 27 mmol/L      ANION GAP 6 mmol/L      BUN 14 mg/dL      Creatinine 1 01 mg/dL      Glucose 180 mg/dL      Calcium 10 2 mg/dL      AST 23 U/L      ALT 29 U/L      Alkaline Phosphatase 107 U/L      Total Protein 8 9 g/dL      Albumin 4 3 g/dL      Total Bilirubin 1 05 mg/dL      eGFR 86 ml/min/1 73sq m     Narrative:      Rex guidelines for Chronic Kidney Disease (CKD):     Stage 1 with normal or high GFR (GFR > 90 mL/min/1 73 square meters)    Stage 2 Mild CKD (GFR = 60-89 mL/min/1 73 square meters)    Stage 3A Moderate CKD (GFR = 45-59 mL/min/1 73 square meters)    Stage 3B Moderate CKD (GFR = 30-44 mL/min/1 73 square meters)    Stage 4 Severe CKD (GFR = 15-29 mL/min/1 73 square meters)    Stage 5 End Stage CKD (GFR <15 mL/min/1 73 square meters)  Note: GFR calculation is accurate only with a steady state creatinine    Lipase [[id number]]  (Normal) Collected: 02/18/22 1028    Lab Status: Final result Specimen: Blood from Arm, Right Updated: 02/18/22 1101     Lipase 176 u/L     CBC and differential [[id number]]  (Abnormal) Collected: 02/18/22 1028    Lab Status: Final result Specimen: Blood from Arm, Right Updated: 02/18/22 1037     WBC 6 97 Thousand/uL      RBC 6 18 Million/uL      Hemoglobin 17 0 g/dL      Hematocrit 52 5 %      MCV 85 fL      MCH 27 5 pg      MCHC 32 4 g/dL      RDW 14 2 %      MPV 10 4 fL      Platelets 081 Thousands/uL      nRBC 0 /100 WBCs      Neutrophils Relative 70 %      Immat GRANS % 0 %      Lymphocytes Relative 23 %      Monocytes Relative 6 %      Eosinophils Relative 0 %      Basophils Relative 1 %      Neutrophils Absolute 4 87 Thousands/µL      Immature Grans Absolute 0 02 Thousand/uL      Lymphocytes Absolute 1 59 Thousands/µL      Monocytes Absolute 0 41 Thousand/µL      Eosinophils Absolute 0 03 Thousand/µL      Basophils Absolute 0 05 Thousands/µL                  CTA dissection protocol chest abdomen pelvis w wo contrast   Final Result by Blanka Bryan MD (02/18 1150)      No evidence for intramural hematoma or aortic dissection  Nonobstructive nephrolithiasis as described above  No evidence for hydronephrosis  Colonic diverticulosis without evidence for acute diverticulitis              Workstation performed: [id number]               Procedures  Procedures      ED Course                                       MDM  Number of Diagnoses or Management Options  Epigastric pain  Nausea & vomiting  Diagnosis management comments: 73-year-old male past medical history of hypertension and diabetes presents with the complaint of sudden onset epigastric abdominal pain and nausea vomiting  Lipase negative  Delta trop negative  EKG nonischemic  CT abdomen pelvis negative for acute pathology  Patient's symptoms are treated here in the emergency department and patient has great relief with single dose of Dilaudid, Zofran and intravenous fluids  Labs grossly unremarkable  Patient would like to go home  Patient is discharged with follow-up perscription for Zofran and return precautions  Amount and/or Complexity of Data Reviewed  Clinical lab tests: ordered and reviewed  Tests in the radiology section of CPT®: ordered and reviewed        Disposition  Final diagnoses:   Epigastric pain   Nausea & vomiting     Time reflects when diagnosis was documented in both MDM as applicable and the Disposition within this note     Time User Action Codes Description Comment    2/18/2022 11:07 AM Irma [place] Add [S22 39XA] Rib fracture     2/18/2022 11:15 AM Irma [place] Remove [S22 39XA] Rib fracture     2/18/2022 12:54 PM Irma [place] Add [R10 13] Epigastric pain     2/18/2022 12:54 PM Irma [place] Add [R11 2] Nausea & vomiting       ED Disposition     ED Disposition Condition Date/Time Comment    Discharge Stable Fri Feb 18, 2022 12:54 PM Mode Aus discharge to home/self care              Follow-up Information     Follow up With Specialties Details Why Contact Info Additional [address] Emergency Department Emergency Medicine Go to  If symptoms worsen or if you have additional concerns [address]  [address] [facility] [address] [facility] Emergency Department, [address] [place], [place], [address] Internal Medicine Schedule an appointment as soon as possible for a visit   [address] [facility]  Jsoe Daniel [address] [facility] [id number] [address] [facility] [place], [address] [facility] [address], [place], [place], [address] [phone]          Discharge Medication List as of 2/18/2022  1:57 PM      START taking these medications    Details   !! ondansetron (Zofran ODT) 4 mg disintegrating tablet Take 1 tablet (4 mg total) by mouth every 6 (six) hours as needed for nausea or vomiting for up to 5 days, Starting Fri 2/18/2022, Until Wed 2/23/2022 at 2359, Print       !! - Potential duplicate medications found  Please discuss with provider        CONTINUE these medications which have NOT CHANGED    Details   amLODIPine (NORVASC) 10 mg tablet TAKE 1 TABLET BY MOUTH EVERY DAY, Historical Med      aspirin (ECOTRIN LOW STRENGTH) 81 mg EC tablet Take 81 mg by mouth, Historical Med      atorvastatin (LIPITOR) 40 mg tablet TAKE 1 TABLET BY MOUTH EVERY DAY, Historical Med      cyclobenzaprine (FLEXERIL) 10 mg tablet Take 1 tablet (10 mg total) by mouth 2 (two) times a day as needed for muscle spasms, Starting Tue 11/2/2021, Print      Diclofenac Sodium (VOLTAREN) 1 % Apply 2 g topically 4 (four) times a day, Starting Tue 11/2/2021, Print      glimepiride (AMARYL) 2 mg tablet Take 2 mg by mouth, Starting Tue 4/20/2021, Historical Med      hydrochlorothiazide (HYDRODIURIL) 25 mg tablet Take 25 mg by mouth, Starting Thu 5/20/2021, Historical Med      hydrocortisone 0 5 % cream Hydrocortisone (Hydrocortisone 0 5 % Cream 28 Gms)  28 35 GM TUBE, Historical Med      lidocaine (LIDODERM) 5 % Apply 1 patch topically daily Remove & Discard patch within 12 hours or as directed by MD, Starting Wed 7/28/2021, Print      lisinopril (ZESTRIL) 40 mg tablet 40 mg, Historical Med      metFORMIN (GLUCOPHAGE) 500 mg tablet 500 mg, Historical Med      naproxen (Naprosyn) 500 mg tablet Take 1 tablet (500 mg total) by mouth 2 (two) times a day with meals for 7 days, Starting Wed 11/10/2021, Until Sat 11/20/2021, Print      olmesartan (BENICAR) 20 mg tablet TAKE 1 TABLET BY MOUTH EVERY DAY, Historical Med      !! ondansetron (Zofran ODT) 4 mg disintegrating tablet Take 1 tablet (4 mg total) by mouth every 6 (six) hours as needed for nausea or vomiting, Starting Sat 11/20/2021, Print      pantoprazole (PROTONIX) 40 mg tablet TAKE 1 TABLET BY MOUTH BEFORE BREAKFAST, Historical Med       !! - Potential duplicate medications found  Please discuss with provider  No discharge procedures on file  PDMP Review     None           ED Provider  Attending physically available and evaluated Johann Kennedy I managed the patient along with the ED Attending      Electronically Signed by         Argenis Martin DO  02/18/22 2248

## 2022-02-18 NOTE — ED ATTENDING ATTESTATION
2/18/2022  IAlana DO, saw and evaluated the patient  I have discussed the patient with the resident/non-physician practitioner and agree with the resident's/non-physician practitioner's findings, Plan of Care, and MDM as documented in the resident's/non-physician practitioner's note, except where noted  All available labs and Radiology studies were reviewed  I was present for key portions of any procedure(s) performed by the resident/non-physician practitioner and I was immediately available to provide assistance  At this point I agree with the current assessment done in the Emergency Department  I have conducted an independent evaluation of this patient a history and physical is as follows:    57-year-old male presents with vomiting and abdominal pain that started 4:00 a m  No fevers or chills  No shortness of breath  Abdominal pain is somewhat diffuse, no radiation to the back  Had some associated diaphoresis with vomiting  Denies chest pain or shortness of breath  On exam-no acute distress, heart regular, no respiratory distress, abdomen soft with mild diffuse tenderness    Plan-CTA chest abdomen pelvis, check labs and reassess    ED Course         Critical Care Time  Procedures

## 2022-06-22 ENCOUNTER — OFFICE VISIT (OUTPATIENT)
Dept: FAMILY MEDICINE CLINIC | Facility: CLINIC | Age: 52
End: 2022-06-22
Payer: COMMERCIAL

## 2022-06-22 ENCOUNTER — TELEPHONE (OUTPATIENT)
Dept: OTHER | Facility: OTHER | Age: 52
End: 2022-06-22

## 2022-06-22 VITALS
RESPIRATION RATE: 16 BRPM | TEMPERATURE: 97.9 F | HEART RATE: 78 BPM | SYSTOLIC BLOOD PRESSURE: 142 MMHG | HEIGHT: 64 IN | OXYGEN SATURATION: 97 % | BODY MASS INDEX: 40.08 KG/M2 | DIASTOLIC BLOOD PRESSURE: 82 MMHG | WEIGHT: 234.8 LBS

## 2022-06-22 DIAGNOSIS — Z00.00 HEALTHCARE MAINTENANCE: Primary | ICD-10-CM

## 2022-06-22 DIAGNOSIS — E11.9 TYPE 2 DIABETES MELLITUS WITHOUT COMPLICATION, UNSPECIFIED WHETHER LONG TERM INSULIN USE (HCC): ICD-10-CM

## 2022-06-22 DIAGNOSIS — E78.2 MIXED HYPERLIPIDEMIA: ICD-10-CM

## 2022-06-22 DIAGNOSIS — I10 PRIMARY HYPERTENSION: ICD-10-CM

## 2022-06-22 DIAGNOSIS — Z12.5 PROSTATE CANCER SCREENING: ICD-10-CM

## 2022-06-22 DIAGNOSIS — Z12.11 SCREENING FOR COLON CANCER: ICD-10-CM

## 2022-06-22 DIAGNOSIS — E66.01 CLASS 2 SEVERE OBESITY DUE TO EXCESS CALORIES WITH SERIOUS COMORBIDITY AND BODY MASS INDEX (BMI) OF 38.0 TO 38.9 IN ADULT (HCC): ICD-10-CM

## 2022-06-22 PROBLEM — E78.5 HYPERLIPIDEMIA: Status: ACTIVE | Noted: 2019-06-21

## 2022-06-22 PROBLEM — E66.9 OBESITY: Status: ACTIVE | Noted: 2020-05-29

## 2022-06-22 PROBLEM — N52.8 OTHER MALE ERECTILE DYSFUNCTION: Status: ACTIVE | Noted: 2019-08-27

## 2022-06-22 PROBLEM — F41.9 ANXIETY: Status: ACTIVE | Noted: 2020-08-14

## 2022-06-22 PROCEDURE — 99204 OFFICE O/P NEW MOD 45 MIN: CPT | Performed by: FAMILY MEDICINE

## 2022-06-22 RX ORDER — ATORVASTATIN CALCIUM 40 MG/1
40 TABLET, FILM COATED ORAL DAILY
Qty: 90 TABLET | Refills: 1 | Status: SHIPPED | OUTPATIENT
Start: 2022-06-22

## 2022-06-22 RX ORDER — OLMESARTAN MEDOXOMIL 20 MG/1
20 TABLET ORAL DAILY
Qty: 90 TABLET | Refills: 1 | Status: SHIPPED | OUTPATIENT
Start: 2022-06-22

## 2022-06-22 RX ORDER — AMLODIPINE BESYLATE 10 MG/1
10 TABLET ORAL DAILY
Qty: 90 TABLET | Refills: 1 | Status: SHIPPED | OUTPATIENT
Start: 2022-06-22

## 2022-06-22 NOTE — ASSESSMENT & PLAN NOTE
Ordered labs  ON metformin 500mg daily, amaryl 2mg daily  Foot exam completed today  Referred to ophthalmology  On lipitor 40 and an ace/arb     No results found for: HGBA1C

## 2022-06-22 NOTE — TELEPHONE ENCOUNTER
Patient is calling to confirm his appointment  Patient states the wrong number was on his chart  Patient's phone number was updated

## 2022-06-22 NOTE — ASSESSMENT & PLAN NOTE
Colon cancer screening discussed  He would like a referral for a colonoscopy  Prostate cancer screening ordered today at the patients request    He is a non smoker and does not qualify for lung cancer screening  He has smoked marijuana in the past and will need AAA screening at 74

## 2022-06-22 NOTE — PROGRESS NOTES
Assessment/Plan:    Hyperlipidemia  Advised to get blood work done  Hypertension  uncontrolled  Blood pressure goal per accord trial would be <140/90  On amlodipine 10, olmesartan  Ran out of meds a couple weeks ago  Sent refills  Restrict daily salt intake up to 2 4 g  Advised to walk 30 minutes a day 5 times a week and practice stress relieving measures      Obesity  Discussed lifestyle changes  Type 2 diabetes mellitus ([address] )  Ordered labs  ON metformin 500mg daily, amaryl 2mg daily  Foot exam completed today  Referred to ophthalmology  On lipitor 40 and an ace/arb  No results found for: HGBA1C    Healthcare maintenance  Colon cancer screening discussed  He would like a referral for a colonoscopy  Prostate cancer screening ordered today at the patients request    He is a non smoker and does not qualify for lung cancer screening  He has smoked marijuana in the past and will need AAA screening at 74  Diagnoses and all orders for this visit:    Healthcare maintenance    Mixed hyperlipidemia  -     Lipid panel; Future  -     Lipid panel  -     atorvastatin (LIPITOR) 40 mg tablet; Take 1 tablet (40 mg total) by mouth daily    Type 2 diabetes mellitus without complication, unspecified whether long term insulin use (HCC)  -     Hemoglobin A1C; Future  -     Ambulatory Referral to Ophthalmology; Future    Primary hypertension  -     Basic metabolic panel; Future  -     amLODIPine (NORVASC) 10 mg tablet; Take 1 tablet (10 mg total) by mouth daily  -     olmesartan (BENICAR) 20 mg tablet; Take 1 tablet (20 mg total) by mouth daily  -     Basic metabolic panel    Class 2 severe obesity due to excess calories with serious comorbidity and body mass index (BMI) of 38 0 to 38 9 in adult [facility])    Screening for colon cancer  -     Ambulatory referral for colonoscopy; Future    Prostate cancer screening  -     PSA, Total Screen; Future  -     PSA, Total Screen        Subjective:     Establish care Patient ID: Samm Alberto is a 50 y o  male with a history of hyperlipidemia, hypertension, diabetes mellitus type 2, obesity    HPI  Patient is establishing care having recently moved from [place]  Reviewed notes from previous PCP  He ran out of medication about 3 weeks ago  Reviewed recent labs which show stable kidney function, elevated fasting sugars 180, mildly increased calcium at 10 2, no anemia  The following portions of the patient's history were reviewed and updated as appropriate:   He   Patient Active Problem List    Diagnosis Date Noted   [address] maintenance 06/22/2022    Anxiety 08/14/2020    Obesity 05/29/2020    Other male erectile dysfunction 08/27/2019    Hyperlipidemia 06/21/2019    Hypertension 06/21/2019    Type 2 diabetes mellitus ([address] ) 06/21/2019     He  has no past surgical history on file  His family history is not on file  He  reports that he has never smoked  He has never used smokeless tobacco  He reports previous alcohol use  He reports previous drug use    Current Outpatient Medications   Medication Sig Dispense Refill    amLODIPine (NORVASC) 10 mg tablet Take 1 tablet (10 mg total) by mouth daily 90 tablet 1    atorvastatin (LIPITOR) 40 mg tablet Take 1 tablet (40 mg total) by mouth daily 90 tablet 1    glimepiride (AMARYL) 2 mg tablet Take 2 mg by mouth      hydrochlorothiazide (HYDRODIURIL) 25 mg tablet Take 25 mg by mouth      olmesartan (BENICAR) 20 mg tablet Take 1 tablet (20 mg total) by mouth daily 90 tablet 1    pantoprazole (PROTONIX) 40 mg tablet TAKE 1 TABLET BY MOUTH BEFORE BREAKFAST      aspirin (ECOTRIN LOW STRENGTH) 81 mg EC tablet Take 81 mg by mouth (Patient not taking: No sig reported)      hydrocortisone 0 5 % cream Hydrocortisone (Hydrocortisone 0 5 % Cream 28 Gms)  28 35 GM TUBE (Patient not taking: No sig reported)      lidocaine (Lidoderm) 5 % Apply 1 patch topically daily for 7 days Remove & Discard patch within 12 hours or as directed by MD 7 patch 0    metFORMIN (GLUCOPHAGE) 500 mg tablet 500 mg (Patient not taking: No sig reported)      naproxen (Naprosyn) 500 mg tablet Take 1 tablet (500 mg total) by mouth 2 (two) times a day with meals for 7 days 14 tablet 0    ondansetron (Zofran ODT) 4 mg disintegrating tablet Take 1 tablet (4 mg total) by mouth every 6 (six) hours as needed for nausea or vomiting (Patient not taking: No sig reported) 20 tablet 0    ondansetron (Zofran ODT) 4 mg disintegrating tablet Take 1 tablet (4 mg total) by mouth every 6 (six) hours as needed for nausea or vomiting for up to 5 days 20 tablet 0     No current facility-administered medications for this visit       Review of Systems   Constitutional: Negative for activity change, appetite change, fever and unexpected weight change  HENT: Negative for ear pain, postnasal drip and rhinorrhea  Eyes: Negative for photophobia and pain  Respiratory: Negative for cough, shortness of breath and wheezing  Cardiovascular: Negative for chest pain, palpitations and leg swelling  Gastrointestinal: Negative for abdominal pain, blood in stool, nausea and vomiting  Endocrine: Negative for polydipsia and polyuria  Genitourinary: Negative for difficulty urinating, hematuria and urgency  Musculoskeletal: Negative for myalgias  Skin: Negative for rash  Neurological: Negative for dizziness  Psychiatric/Behavioral: Negative for confusion and sleep disturbance           PHQ-2/9 Depression Screening    Little interest or pleasure in doing things: 1 - several days  Feeling down, depressed, or hopeless: 1 - several days  PHQ-2 Score: 2  PHQ-2 Interpretation: Negative depression screen       [unfilled]    Objective:      /82 (BP Location: Left arm, Patient Position: Sitting, Cuff Size: Adult)   Pulse 78   Temp 97 9 °F (36 6 °C) (Tympanic)   Resp 16   Ht 5' 4" (1 626 m)   Wt 107 kg (234 lb 12 8 oz)   SpO2 97%   BMI 40 30 kg/m² Physical Exam  Constitutional:       Appearance: He is well-developed  HENT:      Head: Normocephalic and atraumatic  Right Ear: External ear normal       Left Ear: External ear normal       Mouth/Throat:      Pharynx: No oropharyngeal exudate  Eyes:      Conjunctiva/sclera: Conjunctivae normal       Pupils: Pupils are equal, round, and reactive to light  Cardiovascular:      Rate and Rhythm: Normal rate and regular rhythm  Pulses: no weak pulses          Dorsalis pedis pulses are 2+ on the right side and 2+ on the left side  Posterior tibial pulses are 2+ on the right side and 2+ on the left side  Heart sounds: Normal heart sounds  No murmur heard  No friction rub  No gallop  Pulmonary:      Effort: Pulmonary effort is normal  No respiratory distress  Breath sounds: Normal breath sounds  No wheezing or rales  Chest:      Chest wall: No tenderness  Abdominal:      General: Bowel sounds are normal  There is no distension  Palpations: Abdomen is soft  There is no mass  Tenderness: There is no abdominal tenderness  There is no rebound  Musculoskeletal:         General: Normal range of motion  Cervical back: Normal range of motion and neck supple  Feet:      Right foot:      Skin integrity: No ulcer, skin breakdown, erythema, warmth, callus or dry skin  Left foot:      Skin integrity: No ulcer, skin breakdown, erythema, warmth, callus or dry skin  Skin:     General: Skin is warm and dry  Neurological:      Mental Status: He is alert and oriented to person, place, and time  Patient's shoes and socks removed  Right Foot/Ankle   Right Foot Inspection  Skin Exam: skin normal and skin intact  No dry skin, no warmth, no callus, no erythema, no maceration, no abnormal color, no pre-ulcer, no ulcer and no callus  Toe Exam: ROM and strength within normal limits       Sensory   Vibration: intact  Proprioception: intact  Monofilament testing: intact    Vascular  Capillary refills: < 3 seconds  The right DP pulse is 2+  The right PT pulse is 2+  Left Foot/Ankle  Left Foot Inspection  Skin Exam: skin normal and skin intact  No dry skin, no warmth, no erythema, no maceration, normal color, no pre-ulcer, no ulcer and no callus  Toe Exam: ROM and strength within normal limits  Sensory   Vibration: intact  Proprioception: intact  Monofilament testing: intact    Vascular  Capillary refills: < 3 seconds  The left DP pulse is 2+  The left PT pulse is 2+       Assign Risk Category  No deformity present  No loss of protective sensation  No weak pulses  Risk: 0

## 2022-06-22 NOTE — ASSESSMENT & PLAN NOTE
 uncontrolled   Blood pressure goal per accord trial would be <140/90   On amlodipine 10, olmesartan  Ran out of meds a couple weeks ago  Sent refills      Restrict daily salt intake up to 2 4 g   Advised to walk 30 minutes a day 5 times a week and practice stress relieving measures

## 2022-07-05 LAB
BUN SERPL-MCNC: 13 MG/DL (ref 7–25)
BUN/CREAT SERPL: ABNORMAL (CALC) (ref 6–22)
CALCIUM SERPL-MCNC: 10 MG/DL (ref 8.6–10.3)
CHLORIDE SERPL-SCNC: 104 MMOL/L (ref 98–110)
CO2 SERPL-SCNC: 30 MMOL/L (ref 20–32)
CREAT SERPL-MCNC: 0.89 MG/DL (ref 0.7–1.33)
GLUCOSE SERPL-MCNC: 149 MG/DL (ref 65–99)
POTASSIUM SERPL-SCNC: 4.3 MMOL/L (ref 3.5–5.3)
PSA SERPL-MCNC: 0.22 NG/ML
SL AMB EGFR AFRICAN AMERICAN: 116 ML/MIN/1.73M2
SL AMB EGFR NON AFRICAN AMERICAN: 100 ML/MIN/1.73M2
SODIUM SERPL-SCNC: 140 MMOL/L (ref 135–146)

## 2022-07-11 NOTE — RESULT ENCOUNTER NOTE
Can you find out if the lab is going to fun the cholesterol and A1c? It looks like they may have missed it  If so please let the pt know he will need to have more blood drawn

## 2022-10-11 PROBLEM — Z00.00 HEALTHCARE MAINTENANCE: Status: RESOLVED | Noted: 2022-06-22 | Resolved: 2022-10-11

## 2022-11-01 ENCOUNTER — OFFICE VISIT (OUTPATIENT)
Dept: FAMILY MEDICINE CLINIC | Facility: CLINIC | Age: 52
End: 2022-11-01

## 2022-11-01 VITALS
HEIGHT: 64 IN | OXYGEN SATURATION: 96 % | BODY MASS INDEX: 41.48 KG/M2 | DIASTOLIC BLOOD PRESSURE: 78 MMHG | RESPIRATION RATE: 18 BRPM | HEART RATE: 64 BPM | SYSTOLIC BLOOD PRESSURE: 122 MMHG | TEMPERATURE: 97.6 F | WEIGHT: 243 LBS

## 2022-11-01 DIAGNOSIS — E78.2 MIXED HYPERLIPIDEMIA: ICD-10-CM

## 2022-11-01 DIAGNOSIS — E66.01 CLASS 3 SEVERE OBESITY DUE TO EXCESS CALORIES WITH SERIOUS COMORBIDITY AND BODY MASS INDEX (BMI) OF 40.0 TO 44.9 IN ADULT (HCC): ICD-10-CM

## 2022-11-01 DIAGNOSIS — Z12.11 SCREENING FOR COLON CANCER: ICD-10-CM

## 2022-11-01 DIAGNOSIS — Z11.4 ENCOUNTER FOR SCREENING FOR HIV: ICD-10-CM

## 2022-11-01 DIAGNOSIS — Z11.3 SCREEN FOR STD (SEXUALLY TRANSMITTED DISEASE): ICD-10-CM

## 2022-11-01 DIAGNOSIS — E11.69 TYPE 2 DIABETES MELLITUS WITH OTHER SPECIFIED COMPLICATION, UNSPECIFIED WHETHER LONG TERM INSULIN USE (HCC): ICD-10-CM

## 2022-11-01 DIAGNOSIS — I10 PRIMARY HYPERTENSION: ICD-10-CM

## 2022-11-01 DIAGNOSIS — Z00.00 ANNUAL PHYSICAL EXAM: Primary | ICD-10-CM

## 2022-11-01 DIAGNOSIS — Z11.59 NEED FOR HEPATITIS C SCREENING TEST: ICD-10-CM

## 2022-11-01 PROBLEM — E66.813 CLASS 3 SEVERE OBESITY DUE TO EXCESS CALORIES WITH SERIOUS COMORBIDITY AND BODY MASS INDEX (BMI) OF 40.0 TO 44.9 IN ADULT: Status: ACTIVE | Noted: 2020-05-29

## 2022-11-01 LAB — SL AMB POCT HEMOGLOBIN AIC: 7.5 (ref ?–6.5)

## 2022-11-01 RX ORDER — METFORMIN HYDROCHLORIDE 500 MG/1
TABLET, EXTENDED RELEASE ORAL
Qty: 120 TABLET | Refills: 1 | Status: SHIPPED | OUTPATIENT
Start: 2022-11-01

## 2022-11-01 NOTE — PATIENT INSTRUCTIONS

## 2022-11-01 NOTE — PROGRESS NOTES
[facility]    NAME: Shivani Mcguire  AGE: 50 y o  SEX: male  : 1970     DATE: 2022     Assessment and Plan:     Problem List Items Addressed This Visit        Endocrine    Type 2 diabetes mellitus ([address] )     Ordered labs  ON metformin 500mg daily, amaryl 2mg daily  Foot exam done   Referred to ophthalmology last visit  Reminded  On lipitor 40 and an ace/arb  No results found for: HGBA1C         Relevant Medications    metFORMIN (GLUCOPHAGE-XR) 500 mg 24 hr tablet    Other Relevant Orders    POCT hemoglobin A1c (Completed)    Hemoglobin A1C    Ambulatory Referral to Ophthalmology       Cardiovascular and Mediastinum    Hypertension     controlled  Blood pressure goal per accord trial would be <140/90  On amlodipine 10, olmesartan  Restrict daily salt intake up to 2 4 g  Advised to walk 30 minutes a day 5 times a week and practice stress relieving measures           Relevant Orders    Basic metabolic panel       Other    Hyperlipidemia     Continue lipitor 40  Relevant Orders    Lipid panel    Class 3 severe obesity due to excess calories with serious comorbidity and body mass index (BMI) of 40 0 to 44 9 in [facility])     Discussed lifestyle changes  Other Visit Diagnoses     Annual physical exam    -  Primary    Screening for colon cancer        Relevant Orders    Ambulatory Referral to Gastroenterology    Need for hepatitis C screening test        Relevant Orders    Hepatitis C antibody    Encounter for screening for HIV        Relevant Orders    HIV 1/2 Antigen/Antibody (4th Generation) w Reflex [facility]    Screen for STD (sexually transmitted disease)        Relevant Orders    Chlamydia/GC amplified DNA by PCR          Immunizations and preventive care screenings were discussed with patient today   Appropriate education was printed on patient's after visit summary  Counseling:  Alcohol/drug use: discussed moderation in alcohol intake, the recommendations for healthy alcohol use, and avoidance of illicit drug use  Dental Health: discussed importance of regular tooth brushing, flossing, and dental visits  Exercise: the importance of regular exercise/physical activity was discussed  Recommend exercise 3-5 times per week for at least 30 minutes  No follow-ups on file  Chief Complaint:     Chief Complaint   Patient presents with   • Follow-up      History of Present Illness:     Adult Annual Physical   Patient here for a comprehensive physical exam  The patient reports no problems  Diet and Physical Activity  Diet/Nutrition: poor diet  Exercise: no formal exercise  Depression Screening  PHQ-2/9 Depression Screening         General Health  Sleep: sleeps well  Hearing: no issues  Vision: vision problems: blurry vision, ho glaucoma untreated  Dental: no dental visits for >1 year   Health  Symptoms include: none     Review of Systems:     Review of Systems   Constitutional: Negative for activity change, appetite change, fever and unexpected weight change  HENT: Negative for ear pain, postnasal drip and rhinorrhea  Eyes: Negative for photophobia and pain  Respiratory: Negative for cough, shortness of breath and wheezing  Cardiovascular: Negative for chest pain, palpitations and leg swelling  Gastrointestinal: Negative for abdominal pain, blood in stool, nausea and vomiting  Endocrine: Negative for polydipsia and polyuria  Genitourinary: Negative for difficulty urinating, hematuria and urgency  Musculoskeletal: Negative for myalgias  Skin: Negative for rash  Neurological: Negative for dizziness  Psychiatric/Behavioral: Negative for confusion and sleep disturbance  Past Medical History:     Past Medical History:   Diagnosis Date   • Diabetes mellitus ([facility] [address] )       Past Surgical History:     History reviewed  No pertinent surgical history  Family History:     History reviewed  No pertinent family history  Social History:     Social History     Socioeconomic History   • Marital status: Single     Spouse name: None   • Number of children: None   • Years of education: None   • Highest education level: None   Occupational History   • None   Tobacco Use   • Smoking status: Never Smoker   • Smokeless tobacco: Never Used   Vaping Use   • Vaping Use: Never used   Substance and Sexual Activity   • Alcohol use: Not Currently   • Drug use: Not Currently   • Sexual activity: None   Other Topics Concern   • None   Social History Narrative   • None     Social Determinants of Health     Financial Resource Strain: Low Risk    • Difficulty of Paying Living Expenses: Not hard at all   Food Insecurity: Not on file   Transportation Needs: No Transportation Needs   • Lack of Transportation (Medical): No   • Lack of Transportation (Non-Medical): No   Physical Activity: Sufficiently Active   • Days of Exercise per Week: 2 days   • Minutes of Exercise per Session: 120 min   Stress: No Stress Concern Present   • Feeling of Stress :  Only a little   Social Connections: Not on file   Intimate Partner Violence: Not At Risk   • Fear of Current or Ex-Partner: No   • Emotionally Abused: No   • Physically Abused: No   • Sexually Abused: No   Housing Stability: Unknown   • Unable to Pay for Housing in the Last Year: No   • Number of Places Lived in the Last Year: Not on file   • Unstable Housing in the Last Year: No      Current Medications:     Current Outpatient Medications   Medication Sig Dispense Refill   • amLODIPine (NORVASC) 10 mg tablet Take 1 tablet (10 mg total) by mouth daily 90 tablet 1   • atorvastatin (LIPITOR) 40 mg tablet Take 1 tablet (40 mg total) by mouth daily 90 tablet 1   • hydrocortisone 0 5 % cream Hydrocortisone (Hydrocortisone 0 5 % Cream 28 Gms)  28 35 GM TUBE     • metFORMIN (GLUCOPHAGE-XR) 500 mg 24 hr tablet TAKE 1 TABLET DAILY FOR 1 WEEK THEN INCREASE TO 2 TABLETS DAILY FOR 1 WEEK, THEN 3 TABLETS DAILY FOR 1 WEEK THEN 4 TABLETS DAILY 120 tablet 1   • olmesartan (BENICAR) 20 mg tablet Take 1 tablet (20 mg total) by mouth daily 90 tablet 1   • pantoprazole (PROTONIX) 40 mg tablet TAKE 1 TABLET BY MOUTH BEFORE BREAKFAST     • lidocaine (Lidoderm) 5 % Apply 1 patch topically daily for 7 days Remove & Discard patch within 12 hours or as directed by MD Lloyd patch 0   • naproxen (Naprosyn) 500 mg tablet Take 1 tablet (500 mg total) by mouth 2 (two) times a day with meals for 7 days 14 tablet 0     No current facility-administered medications for this visit  Allergies: Allergies   Allergen Reactions   • Lisinopril Swelling      Physical Exam:     /78 (BP Location: Left arm, Patient Position: Sitting, Cuff Size: Adult)   Pulse 64   Temp 97 6 °F (36 4 °C) (Tympanic)   Resp 18   Ht 5' 4" (1 626 m)   Wt 110 kg (243 lb)   SpO2 96%   BMI 41 71 kg/m²     Physical Exam  Constitutional:       General: He is not in acute distress  Appearance: He is well-developed  HENT:      Head: Normocephalic and atraumatic  Eyes:      General: No scleral icterus  Conjunctiva/sclera: Conjunctivae normal       Pupils: Pupils are equal, round, and reactive to light  Cardiovascular:      Rate and Rhythm: Normal rate and regular rhythm  Heart sounds: Normal heart sounds  No murmur heard  No friction rub  No gallop  Pulmonary:      Effort: Pulmonary effort is normal  No respiratory distress  Breath sounds: Normal breath sounds  No wheezing or rales  Abdominal:      General: Bowel sounds are normal  There is no distension  Palpations: Abdomen is soft  There is no mass  Tenderness: There is no abdominal tenderness  There is no guarding  Musculoskeletal:         General: No tenderness  Cervical back: Normal range of motion  Skin:     General: Skin is warm and dry  Findings: No rash  Neurological:      Mental Status: He is alert and oriented to person, place, and time  Cranial Nerves: No cranial nerve deficit  Motor: No abnormal muscle tone            Adi Johnson MD  [address] [place] [address]

## 2022-11-01 NOTE — ASSESSMENT & PLAN NOTE
Colon cancer screening discussed  He would like a referral for a colonoscopy  Prostate cancer screening neg in July  He is a non smoker and does not qualify for lung cancer screening  He has smoked marijuana in the past and will need AAA screening at 74

## 2022-11-01 NOTE — ASSESSMENT & PLAN NOTE
Ordered labs  ON metformin 500mg daily, amaryl 2mg daily  Foot exam done June  Referred to ophthalmology last visit  Reminded  On lipitor 40 and an ace/arb     No results found for: HGBA1C

## 2022-11-01 NOTE — PROGRESS NOTES
Assessment/Plan:    Type 2 diabetes mellitus ([facility] [address] )  Ordered labs  ON metformin 500mg daily, amaryl 2mg daily  Foot exam done June  Referred to ophthalmology last visit  Reminded  On lipitor 40 and an ace/arb  No results found for: HGBA1C    Hyperlipidemia  Continue lipitor 40  Hypertension  controlled  Blood pressure goal per accord trial would be <140/90  On amlodipine 10, olmesartan  Restrict daily salt intake up to 2 4 g  Advised to walk 30 minutes a day 5 times a week and practice stress relieving measures      Class 3 severe obesity due to excess calories with serious comorbidity and body mass index (BMI) of 40 0 to 44 9 in [facility])  Discussed lifestyle changes  Annual physical exam  Colon cancer screening discussed  He would like a referral for a colonoscopy  Prostate cancer screening neg in July  He is a non smoker and does not qualify for lung cancer screening  He has smoked marijuana in the past and will need AAA screening at 74  Diagnoses and all orders for this visit:    Annual physical exam    Type 2 diabetes mellitus with other specified complication, unspecified whether long term insulin use (HCC)  -     POCT hemoglobin A1c  -     metFORMIN (GLUCOPHAGE-XR) 500 mg 24 hr tablet; TAKE 1 TABLET DAILY FOR 1 WEEK THEN INCREASE TO 2 TABLETS DAILY FOR 1 WEEK, THEN 3 TABLETS DAILY FOR 1 WEEK THEN 4 TABLETS DAILY  -     Hemoglobin A1C; Future  -     Ambulatory Referral to Ophthalmology; Future    Mixed hyperlipidemia  -     Lipid panel; Future  -     Lipid panel    Primary hypertension  -     Basic metabolic panel; Future  -     Basic metabolic panel    Class 3 severe obesity due to excess calories with serious comorbidity and body mass index (BMI) of 40 0 to 44 9 in [facility])    Screening for colon cancer  -     Ambulatory Referral to Gastroenterology; Future    Need for hepatitis C screening test  -     Hepatitis C antibody;  Future    Encounter for screening for HIV  - HIV 1/2 Antigen/Antibody (4th Generation) w Reflex [facility]; Future    Screen for STD (sexually transmitted disease)  -     Chlamydia/GC amplified DNA by PCR; Future        Subjective: chronic conditions check up      Patient ID: Greg Milner is a 50 y o  male with a ho HLD, HTN, obesity, DM2    HPI  Labs from July reviewed  Kidney function stable, normal PSA  The following portions of the patient's history were reviewed and updated as appropriate: allergies, current medications, past family history, past medical history, past social history, past surgical history and problem list     Review of Systems   Constitutional: Negative for activity change, appetite change, fever and unexpected weight change  HENT: Negative for ear pain, postnasal drip and rhinorrhea  Eyes: Negative for photophobia and pain  Respiratory: Negative for cough, shortness of breath and wheezing  Cardiovascular: Negative for chest pain, palpitations and leg swelling  Gastrointestinal: Negative for abdominal pain, blood in stool, nausea and vomiting  Endocrine: Negative for polydipsia and polyuria  Genitourinary: Negative for difficulty urinating, hematuria and urgency  Musculoskeletal: Negative for myalgias  Skin: Negative for rash  Neurological: Negative for dizziness  Psychiatric/Behavioral: Negative for confusion and sleep disturbance  PHQ-2/9 Depression Screening         [unfilled]    Objective:      /78 (BP Location: Left arm, Patient Position: Sitting, Cuff Size: Adult)   Pulse 64   Temp 97 6 °F (36 4 °C) (Tympanic)   Resp 18   Ht 5' 4" (1 626 m)   Wt 110 kg (243 lb)   SpO2 96%   BMI 41 71 kg/m²          Physical Exam  Constitutional:       Appearance: He is well-developed  HENT:      Head: Normocephalic and atraumatic  Right Ear: External ear normal       Left Ear: External ear normal       Mouth/Throat:      Pharynx: No oropharyngeal exudate     Eyes:      Conjunctiva/sclera: Conjunctivae normal       Pupils: Pupils are equal, round, and reactive to light  Cardiovascular:      Rate and Rhythm: Normal rate and regular rhythm  Heart sounds: Normal heart sounds  No murmur heard  No friction rub  No gallop  Pulmonary:      Effort: Pulmonary effort is normal  No respiratory distress  Breath sounds: Normal breath sounds  No wheezing or rales  Chest:      Chest wall: No tenderness  Abdominal:      General: Bowel sounds are normal  There is no distension  Palpations: Abdomen is soft  There is no mass  Tenderness: There is no abdominal tenderness  There is no rebound  Musculoskeletal:         General: Normal range of motion  Cervical back: Normal range of motion and neck supple  Skin:     General: Skin is warm and dry  Neurological:      Mental Status: He is alert and oriented to person, place, and time           Recent Results (from the past 3360 hour(s))   Basic metabolic panel    Collection Time: 07/05/22 11:20 AM   Result Value Ref Range    Glucose, Random 149 (H) 65 - 99 mg/dL    BUN 13 7 - 25 mg/dL    Creatinine 0 89 0 70 - 1 33 mg/dL    eGFR Non  100 > OR = 60 mL/min/1 73m2    eGFR  116 > OR = 60 mL/min/1 73m2    SL AMB BUN/CREATININE RATIO NOT APPLICABLE 6 - 22 (calc)    Sodium 140 135 - 146 mmol/L    Potassium 4 3 3 5 - 5 3 mmol/L    Chloride 104 98 - 110 mmol/L    CO2 30 20 - 32 mmol/L    Calcium 10 0 8 6 - 10 3 mg/dL   PSA, Total Screen    Collection Time: 07/05/22 11:20 AM   Result Value Ref Range    Prostate Specific Antigen Total 0 22 < OR = 4 00 ng/mL   POCT hemoglobin A1c    Collection Time: 11/01/22 10:03 AM   Result Value Ref Range    Hemoglobin A1C 7 5 (A) 6 5

## 2022-11-01 NOTE — ASSESSMENT & PLAN NOTE
• controlled  • Blood pressure goal per accord trial would be <140/90    • On amlodipine 10, olmesartan  • Restrict daily salt intake up to 2 4 g  • Advised to walk 30 minutes a day 5 times a week and practice stress relieving measures

## 2022-11-18 ENCOUNTER — LAB (OUTPATIENT)
Dept: LAB | Facility: CLINIC | Age: 52
End: 2022-11-18

## 2022-11-18 DIAGNOSIS — Z11.3 SCREEN FOR STD (SEXUALLY TRANSMITTED DISEASE): ICD-10-CM

## 2022-11-18 LAB
ANION GAP SERPL CALCULATED.3IONS-SCNC: 7 MMOL/L (ref 4–13)
BUN SERPL-MCNC: 12 MG/DL (ref 5–25)
CALCIUM SERPL-MCNC: 9.5 MG/DL (ref 8.3–10.1)
CHLORIDE SERPL-SCNC: 107 MMOL/L (ref 96–108)
CHOLEST SERPL-MCNC: 149 MG/DL
CO2 SERPL-SCNC: 26 MMOL/L (ref 21–32)
CREAT SERPL-MCNC: 0.98 MG/DL (ref 0.6–1.3)
GFR SERPL CREATININE-BSD FRML MDRD: 89 ML/MIN/1.73SQ M
GLUCOSE P FAST SERPL-MCNC: 147 MG/DL (ref 65–99)
HDLC SERPL-MCNC: 42 MG/DL
LDLC SERPL CALC-MCNC: 77 MG/DL (ref 0–100)
NONHDLC SERPL-MCNC: 107 MG/DL
POTASSIUM SERPL-SCNC: 4.1 MMOL/L (ref 3.5–5.3)
SODIUM SERPL-SCNC: 140 MMOL/L (ref 135–147)
TRIGL SERPL-MCNC: 148 MG/DL

## 2022-11-19 LAB
C TRACH DNA SPEC QL NAA+PROBE: NEGATIVE
N GONORRHOEA DNA SPEC QL NAA+PROBE: NEGATIVE

## 2022-11-20 NOTE — RESULT ENCOUNTER NOTE
Please call patient with normal results  Labs are good except for fasting sugars which we already discussed

## 2022-11-27 DIAGNOSIS — E11.69 TYPE 2 DIABETES MELLITUS WITH OTHER SPECIFIED COMPLICATION, UNSPECIFIED WHETHER LONG TERM INSULIN USE (HCC): ICD-10-CM

## 2022-11-27 RX ORDER — METFORMIN HYDROCHLORIDE 500 MG/1
TABLET, EXTENDED RELEASE ORAL
Qty: 360 TABLET | Refills: 1 | Status: SHIPPED | OUTPATIENT
Start: 2022-11-27

## 2025-07-17 ENCOUNTER — HOSPITAL ENCOUNTER (EMERGENCY)
Facility: HOSPITAL | Age: 55
Discharge: HOME | End: 2025-07-17
Attending: EMERGENCY MEDICINE
Payer: COMMERCIAL

## 2025-07-17 ENCOUNTER — HOSPITAL ENCOUNTER (INPATIENT)
Facility: HOSPITAL | Age: 55
LOS: 4 days | Discharge: HOME | DRG: 282 | End: 2025-07-22
Attending: EMERGENCY MEDICINE | Admitting: INTERNAL MEDICINE
Payer: COMMERCIAL

## 2025-07-17 ENCOUNTER — APPOINTMENT (EMERGENCY)
Dept: RADIOLOGY | Facility: HOSPITAL | Age: 55
DRG: 282 | End: 2025-07-17
Payer: COMMERCIAL

## 2025-07-17 VITALS
TEMPERATURE: 98.3 F | HEART RATE: 80 BPM | RESPIRATION RATE: 22 BRPM | SYSTOLIC BLOOD PRESSURE: 196 MMHG | OXYGEN SATURATION: 98 % | DIASTOLIC BLOOD PRESSURE: 98 MMHG

## 2025-07-17 DIAGNOSIS — K86.89 PANCREATIC MASS: Primary | ICD-10-CM

## 2025-07-17 DIAGNOSIS — F12.90 CANNABINOID HYPEREMESIS SYNDROME: ICD-10-CM

## 2025-07-17 DIAGNOSIS — R11.2 NAUSEA & VOMITING: Primary | ICD-10-CM

## 2025-07-17 DIAGNOSIS — R10.9 ABDOMINAL PAIN: ICD-10-CM

## 2025-07-17 DIAGNOSIS — R11.2 CANNABINOID HYPEREMESIS SYNDROME: ICD-10-CM

## 2025-07-17 DIAGNOSIS — R11.10 VOMITING: ICD-10-CM

## 2025-07-17 LAB
ALBUMIN SERPL BCG-MCNC: 4.8 G/DL (ref 3.5–5)
ALP SERPL-CCNC: 103 U/L (ref 34–104)
ALT SERPL W P-5'-P-CCNC: 14 U/L (ref 7–52)
ANION GAP SERPL CALCULATED.3IONS-SCNC: 10 MMOL/L (ref 4–13)
ANION GAP SERPL CALCULATED.3IONS-SCNC: 11 MMOL/L (ref 4–13)
AST SERPL W P-5'-P-CCNC: 16 U/L (ref 13–39)
ATRIAL RATE: 60 BPM
ATRIAL RATE: 74 BPM
BASOPHILS # BLD AUTO: 0.08 THOUSANDS/ÂΜL (ref 0–0.1)
BASOPHILS # BLD MANUAL: 0 THOUSAND/UL (ref 0–0.1)
BASOPHILS NFR BLD AUTO: 1 % (ref 0–1)
BASOPHILS NFR MAR MANUAL: 0 % (ref 0–1)
BILIRUB SERPL-MCNC: 0.65 MG/DL (ref 0.2–1)
BUN SERPL-MCNC: 12 MG/DL (ref 5–25)
BUN SERPL-MCNC: 18 MG/DL (ref 5–25)
CALCIUM SERPL-MCNC: 10 MG/DL (ref 8.4–10.2)
CALCIUM SERPL-MCNC: 9.8 MG/DL (ref 8.4–10.2)
CARDIAC TROPONIN I PNL SERPL HS: 8 NG/L (ref 8–18)
CHLORIDE SERPL-SCNC: 101 MMOL/L (ref 96–108)
CHLORIDE SERPL-SCNC: 105 MMOL/L (ref 96–108)
CO2 SERPL-SCNC: 22 MMOL/L (ref 21–32)
CO2 SERPL-SCNC: 26 MMOL/L (ref 21–32)
CREAT SERPL-MCNC: 0.82 MG/DL (ref 0.6–1.3)
CREAT SERPL-MCNC: 0.83 MG/DL (ref 0.6–1.3)
EOSINOPHIL # BLD AUTO: 0.06 THOUSAND/ÂΜL (ref 0–0.61)
EOSINOPHIL # BLD MANUAL: 0 THOUSAND/UL (ref 0–0.4)
EOSINOPHIL NFR BLD AUTO: 1 % (ref 0–6)
EOSINOPHIL NFR BLD MANUAL: 0 % (ref 0–6)
ERYTHROCYTE [DISTWIDTH] IN BLOOD BY AUTOMATED COUNT: 13.3 % (ref 11.6–15.1)
ERYTHROCYTE [DISTWIDTH] IN BLOOD BY AUTOMATED COUNT: 13.5 % (ref 11.6–15.1)
GFR SERPL CREATININE-BSD FRML MDRD: 100 ML/MIN/1.73SQ M
GFR SERPL CREATININE-BSD FRML MDRD: 100 ML/MIN/1.73SQ M
GIANT PLATELETS BLD QL SMEAR: PRESENT
GLUCOSE SERPL-MCNC: 252 MG/DL (ref 65–140)
GLUCOSE SERPL-MCNC: 262 MG/DL (ref 65–140)
GLUCOSE SERPL-MCNC: 283 MG/DL (ref 65–140)
HCT VFR BLD AUTO: 46.1 % (ref 36.5–49.3)
HCT VFR BLD AUTO: 51.1 % (ref 36.5–49.3)
HGB BLD-MCNC: 15.2 G/DL (ref 12–17)
HGB BLD-MCNC: 15.5 G/DL (ref 12–17)
IMM GRANULOCYTES # BLD AUTO: 0.05 THOUSAND/UL (ref 0–0.2)
IMM GRANULOCYTES NFR BLD AUTO: 1 % (ref 0–2)
LIPASE SERPL-CCNC: 25 U/L (ref 11–82)
LYMPHOCYTES # BLD AUTO: 1.32 THOUSAND/UL (ref 0.6–4.47)
LYMPHOCYTES # BLD AUTO: 10 % (ref 14–44)
LYMPHOCYTES # BLD AUTO: 2.25 THOUSANDS/ÂΜL (ref 0.6–4.47)
LYMPHOCYTES NFR BLD AUTO: 25 % (ref 14–44)
MCH RBC QN AUTO: 27.5 PG (ref 26.8–34.3)
MCH RBC QN AUTO: 27.6 PG (ref 26.8–34.3)
MCHC RBC AUTO-ENTMCNC: 30.3 G/DL (ref 31.4–37.4)
MCHC RBC AUTO-ENTMCNC: 33 G/DL (ref 31.4–37.4)
MCV RBC AUTO: 84 FL (ref 82–98)
MCV RBC AUTO: 91 FL (ref 82–98)
MONOCYTES # BLD AUTO: 0.3 THOUSAND/UL (ref 0–1.22)
MONOCYTES # BLD AUTO: 0.59 THOUSAND/ÂΜL (ref 0.17–1.22)
MONOCYTES NFR BLD AUTO: 6 % (ref 4–12)
MONOCYTES NFR BLD: 3 % (ref 4–12)
NEUTROPHILS # BLD AUTO: 6.15 THOUSANDS/ÂΜL (ref 1.85–7.62)
NEUTROPHILS # BLD MANUAL: 8.53 THOUSAND/UL (ref 1.85–7.62)
NEUTS SEG NFR BLD AUTO: 66 % (ref 43–75)
NEUTS SEG NFR BLD AUTO: 84 % (ref 43–75)
NRBC BLD AUTO-RTO: 0 /100 WBCS
P AXIS: 37 DEGREES
P AXIS: 50 DEGREES
PLATELET # BLD AUTO: 297 THOUSANDS/UL (ref 149–390)
PLATELET # BLD AUTO: 385 THOUSANDS/UL (ref 149–390)
PLATELET BLD QL SMEAR: ADEQUATE
PMV BLD AUTO: 10.3 FL (ref 8.9–12.7)
PMV BLD AUTO: 10.9 FL (ref 8.9–12.7)
POTASSIUM SERPL-SCNC: 3.1 MMOL/L (ref 3.5–5.3)
POTASSIUM SERPL-SCNC: 3.7 MMOL/L (ref 3.5–5.3)
PR INTERVAL: 154 MS
PR INTERVAL: 160 MS
PROT SERPL-MCNC: 7.9 G/DL (ref 6.4–8.4)
QRS AXIS: 17 DEGREES
QRS AXIS: 42 DEGREES
QRSD INTERVAL: 96 MS
QRSD INTERVAL: 96 MS
QT INTERVAL: 400 MS
QT INTERVAL: 420 MS
QTC INTERVAL: 420 MS
QTC INTERVAL: 444 MS
RBC # BLD AUTO: 5.51 MILLION/UL (ref 3.88–5.62)
RBC # BLD AUTO: 5.63 MILLION/UL (ref 3.88–5.62)
RBC MORPH BLD: NORMAL
SODIUM SERPL-SCNC: 137 MMOL/L (ref 135–147)
SODIUM SERPL-SCNC: 138 MMOL/L (ref 135–147)
T WAVE AXIS: 52 DEGREES
T WAVE AXIS: 71 DEGREES
VARIANT LYMPHS # BLD AUTO: 3 %
VENTRICULAR RATE: 60 BPM
VENTRICULAR RATE: 74 BPM
WBC # BLD AUTO: 10.15 THOUSAND/UL (ref 4.31–10.16)
WBC # BLD AUTO: 9.18 THOUSAND/UL (ref 4.31–10.16)

## 2025-07-17 PROCEDURE — 80048 BASIC METABOLIC PNL TOTAL CA: CPT

## 2025-07-17 PROCEDURE — 96361 HYDRATE IV INFUSION ADD-ON: CPT

## 2025-07-17 PROCEDURE — 99284 EMERGENCY DEPT VISIT MOD MDM: CPT

## 2025-07-17 PROCEDURE — 96375 TX/PRO/DX INJ NEW DRUG ADDON: CPT

## 2025-07-17 PROCEDURE — 83690 ASSAY OF LIPASE: CPT

## 2025-07-17 PROCEDURE — 84484 ASSAY OF TROPONIN QUANT: CPT | Performed by: EMERGENCY MEDICINE

## 2025-07-17 PROCEDURE — 96376 TX/PRO/DX INJ SAME DRUG ADON: CPT

## 2025-07-17 PROCEDURE — 93005 ELECTROCARDIOGRAM TRACING: CPT

## 2025-07-17 PROCEDURE — 96374 THER/PROPH/DIAG INJ IV PUSH: CPT

## 2025-07-17 PROCEDURE — 99285 EMERGENCY DEPT VISIT HI MDM: CPT | Performed by: EMERGENCY MEDICINE

## 2025-07-17 PROCEDURE — 85025 COMPLETE CBC W/AUTO DIFF WBC: CPT

## 2025-07-17 PROCEDURE — 74177 CT ABD & PELVIS W/CONTRAST: CPT

## 2025-07-17 PROCEDURE — 93010 ELECTROCARDIOGRAM REPORT: CPT | Performed by: INTERNAL MEDICINE

## 2025-07-17 PROCEDURE — 85007 BL SMEAR W/DIFF WBC COUNT: CPT

## 2025-07-17 PROCEDURE — 85027 COMPLETE CBC AUTOMATED: CPT

## 2025-07-17 PROCEDURE — 99284 EMERGENCY DEPT VISIT MOD MDM: CPT | Performed by: EMERGENCY MEDICINE

## 2025-07-17 PROCEDURE — 36415 COLL VENOUS BLD VENIPUNCTURE: CPT

## 2025-07-17 PROCEDURE — 80053 COMPREHEN METABOLIC PANEL: CPT

## 2025-07-17 PROCEDURE — 82948 REAGENT STRIP/BLOOD GLUCOSE: CPT

## 2025-07-17 RX ORDER — KETOROLAC TROMETHAMINE 30 MG/ML
15 INJECTION, SOLUTION INTRAMUSCULAR; INTRAVENOUS ONCE
Status: COMPLETED | OUTPATIENT
Start: 2025-07-17 | End: 2025-07-17

## 2025-07-17 RX ORDER — ONDANSETRON 4 MG/1
4 TABLET, ORALLY DISINTEGRATING ORAL ONCE
Status: COMPLETED | OUTPATIENT
Start: 2025-07-17 | End: 2025-07-17

## 2025-07-17 RX ORDER — METOCLOPRAMIDE 10 MG/1
10 TABLET ORAL EVERY 6 HOURS PRN
Qty: 20 TABLET | Refills: 0 | Status: SHIPPED | OUTPATIENT
Start: 2025-07-17 | End: 2025-07-22

## 2025-07-17 RX ORDER — DROPERIDOL 2.5 MG/ML
0.62 INJECTION, SOLUTION INTRAMUSCULAR; INTRAVENOUS ONCE
Status: COMPLETED | OUTPATIENT
Start: 2025-07-17 | End: 2025-07-17

## 2025-07-17 RX ORDER — DROPERIDOL 2.5 MG/ML
1.25 INJECTION, SOLUTION INTRAMUSCULAR; INTRAVENOUS ONCE
Status: COMPLETED | OUTPATIENT
Start: 2025-07-17 | End: 2025-07-17

## 2025-07-17 RX ORDER — ONDANSETRON 4 MG/1
4 TABLET, ORALLY DISINTEGRATING ORAL ONCE
Status: DISCONTINUED | OUTPATIENT
Start: 2025-07-17 | End: 2025-07-17 | Stop reason: HOSPADM

## 2025-07-17 RX ADMIN — DROPERIDOL 1.25 MG: 2.5 INJECTION, SOLUTION INTRAMUSCULAR; INTRAVENOUS at 12:36

## 2025-07-17 RX ADMIN — IOHEXOL 75 ML: 350 INJECTION, SOLUTION INTRAVENOUS at 21:53

## 2025-07-17 RX ADMIN — ONDANSETRON 4 MG: 4 TABLET, ORALLY DISINTEGRATING ORAL at 21:04

## 2025-07-17 RX ADMIN — DROPERIDOL 0.62 MG: 2.5 INJECTION, SOLUTION INTRAMUSCULAR; INTRAVENOUS at 21:02

## 2025-07-17 RX ADMIN — DROPERIDOL 0.62 MG: 2.5 INJECTION, SOLUTION INTRAMUSCULAR; INTRAVENOUS at 11:46

## 2025-07-17 RX ADMIN — KETOROLAC TROMETHAMINE 15 MG: 30 INJECTION, SOLUTION INTRAMUSCULAR; INTRAVENOUS at 21:04

## 2025-07-17 RX ADMIN — SODIUM CHLORIDE 1000 ML: 0.9 INJECTION, SOLUTION INTRAVENOUS at 11:48

## 2025-07-17 RX ADMIN — SODIUM CHLORIDE 1000 ML: 0.9 INJECTION, SOLUTION INTRAVENOUS at 21:09

## 2025-07-17 NOTE — ED PROVIDER NOTES
Final Diagnoses:     1. Nausea & vomiting    2. Cannabinoid hyperemesis syndrome      ED Course as of 07/17/25 1440   Thu Jul 17, 2025   1200 - MITCH = Na-Cl = 33 (acidosis)    - SIG =  11 - 2.5 x 4.8 = -1 (normal)    - AG = 11 (normal)    Likely mild acidosis.    1201 WBC: 9.18   1202 Hemoglobin: 15.5   1202 Platelet Count: 297   1202 Potassium(!): 3.1  Vomiting related.   1202 Carbon Dioxide: 22   1204 ECG 12 lead  EKG reviewed.   1226 Much better (but still nauseous somewhat) after the droperidol.  No longer diaphoretic  Wants to stand up to urinate.   1244 HS TnI random: 8   1323 I reviewed all testing with the patient:   Low K+ potentially due to vomiting    Feels significantly better than on arrival.  Discussed PO challenge + DC.     I gave oral return precautions for what to return for in addition to the written return precautions.   The patient verbalized understanding of the discharge instructions and warnings that would necessitate return to the Emergency Department.  I specifically highlighted areas of special concern regarding the written and verbal discharge instructions and return precautions.    All questions were answered prior to discharge.      Tolerated PO intake.     Nursing Triage:     Chief Complaint   Patient presents with    Vomiting     Pt has been vomiting since this morning.  Episodes of sweating.  Was having similar episodes like this prior to moving here.  Stated that he is diabetic.       HPI:   This is a 55 y.o. male presenting for evaluation of nausea/vomiting/belly pain/dipahoresis.  Was perfectly fine yesterday.  Smokes marijuana daily.  Since this morning, since waking up, having severe nausea, vomiting, dry-heaving, belly pain (epigastric).  No CP.  No SOB.  No f/ch/dizziness  Sweaty with it.  No falls/trauma  Hx of simialr in [place]. He doesn't know what the cause is.     EKG    Procedure Note: EKG  Date/Time: 07/17/25 12:04 PM   Interpreted by: MALLORY SNELL   Indications /  Diagnosis: nausea/vomiting  ECG reviewed by me, the ED Provider: yes   The EKG demonstrates:   Rhythm: HR 60 normal sinus  Intervals: normal intervals  Axis: normal axis  QRS/Blocks: normal QRS though  borderline LVH.   ST Changes: No acute ST Changes, no STD/JOSE.  +U-waves present  Similar to previous.    PMH: HTN.   +marijuana use.  ASSESSMENT + PLAN:   - given the presentation, will check CBC for marked leukocytosis  - CMP for liver enzyme elevation that could signal cholecystitis, biliary obstructive disease. Check RFTs for COLLIN / markers of dehydration.  - Lipase given abdominal pain to evaluate specifically for pancreatitis.  - Given age, will do EKG/Troponin as perhaps an atypical presentation of ACS.  HEART score:  History 0=Slightly or non-suspicious   ECG 0=Normal   Age 1= > 45 - <65 years   Risk Factors 1= 1 or 2 risk factors   Troponin 0= Less than or equal to 12 ng/L   Total 2   - High suspicon that this is cannabinoid hyperemesis syndrome. Discussed with patient.  - If no improvement ? imaging.   - Disposition per workup.     Physical:   Pertinent: appears dipahoretic + grabbing epigastric belly + uncomfortable.     General: VS reviewed  Appears in NAD  awake, alert.   Well-nourished, well-developed. Appears stated age.   Speaking normally in full sentences.   Head: Normocephalic, atraumatic  Eyes: EOM-I. No diplopia.   No hyphema.   No subconjunctival hemorrhages.  Symmetrical lids.   ENT: Atraumatic external nose and ears.    Dry MM  No malocclusion. No stridor. Normal phonation. No drooling. Normal swallowing.   Neck: No JVD.  CV: No pallor noted  Lungs:   No tachypnea  No respiratory distress  Abd: soft   Focal tenderness in the epigastric region without rest of belly or CVA tenderness  nd   no rebound/guarding  MSK:   FROM spontaneously  Skin: Dry, intact.   Neuro: Awake, alert, GCS15, CN II-XII grossly intact.   Motor grossly intact.  Psychiatric/Behavioral: interacting normally; appropriate  mood/affect.    Exam: deferred    Vitals:    07/17/25 1104   BP: (!) 196/98   Pulse: 80   Resp: 22   Temp: 98.3 °F (36.8 °C)   TempSrc: Oral   SpO2: 98%     - There are no obvious limitations to social determinants of care.   - Nursing note reviewed.   - Vitals reviewed.   - Orders placed by myself and/or advanced practitioner / resident.    - Previous chart was reviewed  - History obtained from patient.    - Language barrier: None  - Limitations to the history obtained: None    Past Medical:    has a past medical history of Diabetes mellitus (HCC) and Hypertension.    Past Surgical:    has no past surgical history on file.    Social:     Social History     Substance and Sexual Activity   Alcohol Use Not Currently     Tobacco Use History[1]  Social History     Substance and Sexual Activity   Drug Use Not Currently       Echo:   No results found for this or any previous visit.    No results found for this or any previous visit.      Cath:    No results found for this or any previous visit.      Code Status: No Order  Advance Directive and Living Will:      Power of :    POLST:    Medications   ondansetron (ZOFRAN-ODT) dispersible tablet 4 mg (4 mg Oral Not Given 7/17/25 1106)   droperidol (INAPSINE) injection 0.625 mg (0.625 mg Intravenous Given 7/17/25 1146)   sodium chloride 0.9 % bolus 1,000 mL (0 mL Intravenous Stopped 7/17/25 1333)   droperidol (INAPSINE) injection 1.25 mg (1.25 mg Intravenous Given 7/17/25 1236)     No orders to display     Orders Placed This Encounter   Procedures    CBC and differential    Comprehensive metabolic panel    Lipase    High Sensitivity Troponin I Random    Insert peripheral IV    Notify physician    Nursing Communication Please PO Challenge the patient.    ECG 12 lead    ECG 12 lead     Labs Reviewed   CBC AND DIFFERENTIAL - Abnormal       Result Value Ref Range Status    WBC 9.18  4.31 - 10.16 Thousand/uL Final    RBC 5.63 (*) 3.88 - 5.62 Million/uL Final    Hemoglobin  15.5  12.0 - 17.0 g/dL Final    Hematocrit 51.1 (*) 36.5 - 49.3 % Final    MCV 91  82 - 98 fL Final    MCH 27.5  26.8 - 34.3 pg Final    MCHC 30.3 (*) 31.4 - 37.4 g/dL Final    RDW 13.5  11.6 - 15.1 % Final    MPV 10.9  8.9 - 12.7 fL Final    Platelets 297  149 - 390 Thousands/uL Final    nRBC 0  /100 WBCs Final    Segmented % 66  43 - 75 % Final    Immature Grans % 1  0 - 2 % Final    Lymphocytes % 25  14 - 44 % Final    Monocytes % 6  4 - 12 % Final    Eosinophils Relative 1  0 - 6 % Final    Basophils Relative 1  0 - 1 % Final    Absolute Neutrophils 6.15  1.85 - 7.62 Thousands/µL Final    Absolute Immature Grans 0.05  0.00 - 0.20 Thousand/uL Final    Absolute Lymphocytes 2.25  0.60 - 4.47 Thousands/µL Final    Absolute Monocytes 0.59  0.17 - 1.22 Thousand/µL Final    Eosinophils Absolute 0.06  0.00 - 0.61 Thousand/µL Final    Basophils Absolute 0.08  0.00 - 0.10 Thousands/µL Final   COMPREHENSIVE METABOLIC PANEL - Abnormal    Sodium 138  135 - 147 mmol/L Final    Potassium 3.1 (*) 3.5 - 5.3 mmol/L Final    Chloride 105  96 - 108 mmol/L Final    CO2 22  21 - 32 mmol/L Final    ANION GAP 11  4 - 13 mmol/L Final    BUN 18  5 - 25 mg/dL Final    Creatinine 0.82  0.60 - 1.30 mg/dL Final    Comment: Standardized to IDMS reference method    Glucose 252 (*) 65 - 140 mg/dL Final    Comment: If the patient is fasting, the ADA then defines impaired fasting glucose as > 100 mg/dL and diabetes as > or equal to 123 mg/dL.    Calcium 10.0  8.4 - 10.2 mg/dL Final    AST 16  13 - 39 U/L Final    ALT 14  7 - 52 U/L Final    Comment: Specimen collection should occur prior to Sulfasalazine administration due to the potential for falsely depressed results.     Alkaline Phosphatase 103  34 - 104 U/L Final    Total Protein 7.9  6.4 - 8.4 g/dL Final    Albumin 4.8  3.5 - 5.0 g/dL Final    Total Bilirubin 0.65  0.20 - 1.00 mg/dL Final    Comment: Use of this assay is not recommended for patients undergoing treatment with eltrombopag due  to the potential for falsely elevated results.  N-acetyl-p-benzoquinone imine (metabolite of Acetaminophen) will generate erroneously low results in samples for patients that have taken an overdose of Acetaminophen.    eGFR 100  ml/min/1.73sq m Final    Narrative:     National Kidney Disease Foundation guidelines for Chronic Kidney Disease (CKD):     Stage 1 with normal or high GFR (GFR > 90 mL/min/1.73 square meters)    Stage 2 Mild CKD (GFR = 60-89 mL/min/1.73 square meters)    Stage 3A Moderate CKD (GFR = 45-59 mL/min/1.73 square meters)    Stage 3B Moderate CKD (GFR = 30-44 mL/min/1.73 square meters)    Stage 4 Severe CKD (GFR = 15-29 mL/min/1.73 square meters)    Stage 5 End Stage CKD (GFR <15 mL/min/1.73 square meters)  Note: GFR calculation is accurate only with a steady state creatinine   POCT GLUCOSE - Abnormal    POC Glucose 283 (*) 65 - 140 mg/dl Final   LIPASE - Normal    Lipase 25  11 - 82 u/L Final   HIGH SENSITIVITY TROPONIN I RANDOM - Normal    HS TnI random 8  8 - 18 ng/L Final    Comment:                                              Initial (time 0) result  If >=50 ng/L, Myocardial injury suggested ;  Type of myocardial injury and treatment strategy  to be determined.  If 5-49 ng/L, a delta result at 2 hours will be needed to further evaluate.  If <4 ng/L, and chest pain has been >3 hours since onset, patient may qualify for discharge based on the HEART score in the ED.  If <5 ng/L and <3hours since onset of chest pain, a delta result at 2 hours will be needed to further evaluate.    HS Troponin 99th Percentile URL of a Health Population=12 ng/L with a 95% Confidence Interval of 8-18 ng/L.    Second Troponin (time 2 hours)  If calculated delta >= 20 ng/L,  Myocardial injury suggested ; Type of myocardial injury and treatment strategy to be determined.  If 5-49 ng/L and the calculated delta is 5-19 ng/L, consult medical service for evaluation.  Continue evaluation for ischemia on ecg and other  possible etiology and repeat hs troponin at 4 hours.  If delta is <5 ng/L at 2 hours, consider discharge based on risk stratification via the HEART score (if in ED), or LIZETH risk score in IP/Observation.    HS Troponin 99th Percentile URL of a Health Population=12 ng/L with a 95% Confidence Interval of 8-18 ng/L.     Time reflects when diagnosis was documented in both MDM as applicable and the Disposition within this note       Time User Action Codes Description Comment    7/17/2025  1:21 PM Rivera Blankenship Add [R11.2] Nausea & vomiting     7/17/2025  1:21 PM Rivera Blankenship Add [R11.2,  F12.90] Cannabinoid hyperemesis syndrome           ED Disposition       ED Disposition   Discharge    Condition   Stable    Date/Time   Thu Jul 17, 2025  1:21 PM    Comment   Elliot Woods discharge to home/self care.                   Follow-up Information       Follow up With Specialties Details Why Contact Info Additional Information    [facility] Emergency Department Emergency Medicine Go to  If symptoms worsen [address] [facility] [address]  [phone] [facility] Emergency Department, [address], [place], [address]   [phone]    [facility] Call in 1 day To make appt. for evaluation in the 2 weeks [address] [address] [phone] [facility] [place], [address] [place] [address], [address], [address] [phone]    [facility]  Call today To help find a PCP in your area. [phone]             Discharge Medication List as of 7/17/2025  1:23 PM        START taking these medications    Details   metoclopramide (Reglan) 10 mg tablet Take 1 tablet (10 mg total) by mouth every 6 (six) hours as needed (nausea) for up to 5 days, Starting Thu 7/17/2025, Until Tue 7/22/2025 at 2359, Print           CONTINUE these  medications which have NOT CHANGED    Details   amLODIPine (NORVASC) 10 mg tablet Take 1 tablet (10 mg total) by mouth daily, Starting Wed 6/22/2022, Normal      atorvastatin (LIPITOR) 40 mg tablet Take 1 tablet (40 mg total) by mouth daily, Starting Wed 6/22/2022, Normal      hydrocortisone 0.5 % cream Hydrocortisone (Hydrocortisone 0.5 % Cream 28 Gms)  28.35 GM TUBE, Historical Med      metFORMIN (GLUCOPHAGE-XR) 500 mg 24 hr tablet TAKE 1 TABLET BY MOUTH DAILY X 1 WEEK, THEN INCREASE TO 2 TABS X 1 WEEK, THEN 3 TABS X 1 WEEK, THEN 4 TABS DAILY, Normal      naproxen (Naprosyn) 500 mg tablet Take 1 tablet (500 mg total) by mouth 2 (two) times a day with meals for 7 days, Starting Wed 11/10/2021, Until Sat 11/20/2021, Print      olmesartan (BENICAR) 20 mg tablet Take 1 tablet (20 mg total) by mouth daily, Starting Wed 6/22/2022, Normal      pantoprazole (PROTONIX) 40 mg tablet TAKE 1 TABLET BY MOUTH BEFORE BREAKFAST, Historical Med           No discharge procedures on file.  Prior to Admission Medications   Prescriptions Last Dose Informant Patient Reported? Taking?   amLODIPine (NORVASC) 10 mg tablet   No No   Sig: Take 1 tablet (10 mg total) by mouth daily   atorvastatin (LIPITOR) 40 mg tablet   No No   Sig: Take 1 tablet (40 mg total) by mouth daily   hydrocortisone 0.5 % cream  Self Yes No   Sig: Hydrocortisone (Hydrocortisone 0.5 % Cream 28 Gms)  28.35 GM TUBE   metFORMIN (GLUCOPHAGE-XR) 500 mg 24 hr tablet   No No   Sig: TAKE 1 TABLET BY MOUTH DAILY X 1 WEEK, THEN INCREASE TO 2 TABS X 1 WEEK, THEN 3 TABS X 1 WEEK, THEN 4 TABS DAILY   naproxen (Naprosyn) 500 mg tablet   No No   Sig: Take 1 tablet (500 mg total) by mouth 2 (two) times a day with meals for 7 days   olmesartan (BENICAR) 20 mg tablet   No No   Sig: Take 1 tablet (20 mg total) by mouth daily   pantoprazole (PROTONIX) 40 mg tablet  Self Yes No   Sig: TAKE 1 TABLET BY MOUTH BEFORE BREAKFAST      Facility-Administered Medications: None               "          Portions of the record may have been created with voice recognition software. Occasional wrong word or \"sound a like\" substitutions may have occurred due to the inherent limitations of voice recognition software. Read the chart carefully and recognize, using context, where substitutions have occurred.    Electronically signed by:  Rivera Blankenship         [1]   Social History  Tobacco Use   Smoking Status Never   Smokeless Tobacco Never        Rivera Blankenship MD  07/17/25 3409    "

## 2025-07-17 NOTE — Clinical Note
Elliot Woods was seen and treated in our emergency department on 7/17/2025.    No restrictions            Diagnosis: abdominal pain    Elliot  .    He may return on this date: 07/19/2025         If you have any questions or concerns, please don't hesitate to call.      Rivera Blankenship MD    ______________________________           _______________          _______________  Hospital Representative                              Date                                Time

## 2025-07-17 NOTE — DISCHARGE INSTRUCTIONS
Please use the reglan to help your nausea if necessary.  If you can't keep anything by mouth OR Your pain is worsening OR any of the other things we talked about, please retur nto the emergency room for re-evaluation!!

## 2025-07-18 ENCOUNTER — ANESTHESIA (INPATIENT)
Dept: GASTROENTEROLOGY | Facility: HOSPITAL | Age: 55
DRG: 282 | End: 2025-07-18
Payer: COMMERCIAL

## 2025-07-18 ENCOUNTER — APPOINTMENT (INPATIENT)
Dept: GASTROENTEROLOGY | Facility: HOSPITAL | Age: 55
DRG: 282 | End: 2025-07-18
Attending: STUDENT IN AN ORGANIZED HEALTH CARE EDUCATION/TRAINING PROGRAM
Payer: COMMERCIAL

## 2025-07-18 ENCOUNTER — ANESTHESIA EVENT (INPATIENT)
Dept: GASTROENTEROLOGY | Facility: HOSPITAL | Age: 55
DRG: 282 | End: 2025-07-18
Payer: COMMERCIAL

## 2025-07-18 PROBLEM — K86.89 PANCREATIC MASS: Status: ACTIVE | Noted: 2025-07-18

## 2025-07-18 LAB
ALBUMIN SERPL BCG-MCNC: 4.1 G/DL (ref 3.5–5)
ALP SERPL-CCNC: 90 U/L (ref 34–104)
ALT SERPL W P-5'-P-CCNC: 16 U/L (ref 7–52)
ANION GAP SERPL CALCULATED.3IONS-SCNC: 10 MMOL/L (ref 4–13)
APTT PPP: 27 SECONDS (ref 23–34)
AST SERPL W P-5'-P-CCNC: 15 U/L (ref 13–39)
BASOPHILS # BLD AUTO: 0.02 THOUSANDS/ÂΜL (ref 0–0.1)
BASOPHILS NFR BLD AUTO: 0 % (ref 0–1)
BILIRUB SERPL-MCNC: 0.63 MG/DL (ref 0.2–1)
BUN SERPL-MCNC: 13 MG/DL (ref 5–25)
CALCIUM SERPL-MCNC: 9.4 MG/DL (ref 8.4–10.2)
CHLORIDE SERPL-SCNC: 103 MMOL/L (ref 96–108)
CO2 SERPL-SCNC: 25 MMOL/L (ref 21–32)
CREAT SERPL-MCNC: 0.86 MG/DL (ref 0.6–1.3)
EOSINOPHIL # BLD AUTO: 0 THOUSAND/ÂΜL (ref 0–0.61)
EOSINOPHIL NFR BLD AUTO: 0 % (ref 0–6)
ERYTHROCYTE [DISTWIDTH] IN BLOOD BY AUTOMATED COUNT: 13.5 % (ref 11.6–15.1)
EST. AVERAGE GLUCOSE BLD GHB EST-MCNC: 169 MG/DL
GFR SERPL CREATININE-BSD FRML MDRD: 99 ML/MIN/1.73SQ M
GLUCOSE SERPL-MCNC: 126 MG/DL (ref 65–140)
GLUCOSE SERPL-MCNC: 127 MG/DL (ref 65–140)
GLUCOSE SERPL-MCNC: 135 MG/DL (ref 65–140)
GLUCOSE SERPL-MCNC: 138 MG/DL (ref 65–140)
GLUCOSE SERPL-MCNC: 164 MG/DL (ref 65–140)
GLUCOSE SERPL-MCNC: 212 MG/DL (ref 65–140)
GLUCOSE SERPL-MCNC: 262 MG/DL (ref 65–140)
HBA1C MFR BLD: 7.5 %
HCT VFR BLD AUTO: 41.2 % (ref 36.5–49.3)
HGB BLD-MCNC: 13.4 G/DL (ref 12–17)
IMM GRANULOCYTES # BLD AUTO: 0.03 THOUSAND/UL (ref 0–0.2)
IMM GRANULOCYTES NFR BLD AUTO: 0 % (ref 0–2)
INR PPP: 1.06 (ref 0.85–1.19)
LYMPHOCYTES # BLD AUTO: 1.66 THOUSANDS/ÂΜL (ref 0.6–4.47)
LYMPHOCYTES NFR BLD AUTO: 14 % (ref 14–44)
MAGNESIUM SERPL-MCNC: 1.8 MG/DL (ref 1.9–2.7)
MCH RBC QN AUTO: 27.4 PG (ref 26.8–34.3)
MCHC RBC AUTO-ENTMCNC: 32.5 G/DL (ref 31.4–37.4)
MCV RBC AUTO: 84 FL (ref 82–98)
MONOCYTES # BLD AUTO: 1.13 THOUSAND/ÂΜL (ref 0.17–1.22)
MONOCYTES NFR BLD AUTO: 9 % (ref 4–12)
NEUTROPHILS # BLD AUTO: 9.14 THOUSANDS/ÂΜL (ref 1.85–7.62)
NEUTS SEG NFR BLD AUTO: 77 % (ref 43–75)
NRBC BLD AUTO-RTO: 0 /100 WBCS
PLATELET # BLD AUTO: 329 THOUSANDS/UL (ref 149–390)
PMV BLD AUTO: 10.5 FL (ref 8.9–12.7)
POTASSIUM SERPL-SCNC: 3.6 MMOL/L (ref 3.5–5.3)
PROT SERPL-MCNC: 7.1 G/DL (ref 6.4–8.4)
PROTHROMBIN TIME: 14.1 SECONDS (ref 12.3–15)
RBC # BLD AUTO: 4.89 MILLION/UL (ref 3.88–5.62)
SODIUM SERPL-SCNC: 138 MMOL/L (ref 135–147)
WBC # BLD AUTO: 11.98 THOUSAND/UL (ref 4.31–10.16)

## 2025-07-18 PROCEDURE — RECHECK: Performed by: NURSE PRACTITIONER

## 2025-07-18 PROCEDURE — C1889 IMPLANT/INSERT DEVICE, NOC: HCPCS

## 2025-07-18 PROCEDURE — 99223 1ST HOSP IP/OBS HIGH 75: CPT | Performed by: INTERNAL MEDICINE

## 2025-07-18 PROCEDURE — 83036 HEMOGLOBIN GLYCOSYLATED A1C: CPT | Performed by: INTERNAL MEDICINE

## 2025-07-18 PROCEDURE — 85610 PROTHROMBIN TIME: CPT | Performed by: INTERNAL MEDICINE

## 2025-07-18 PROCEDURE — 36415 COLL VENOUS BLD VENIPUNCTURE: CPT | Performed by: INTERNAL MEDICINE

## 2025-07-18 PROCEDURE — 0FBG3ZX EXCISION OF PANCREAS, PERCUTANEOUS APPROACH, DIAGNOSTIC: ICD-10-PCS | Performed by: INTERNAL MEDICINE

## 2025-07-18 PROCEDURE — 85025 COMPLETE CBC W/AUTO DIFF WBC: CPT | Performed by: INTERNAL MEDICINE

## 2025-07-18 PROCEDURE — 80053 COMPREHEN METABOLIC PANEL: CPT | Performed by: INTERNAL MEDICINE

## 2025-07-18 PROCEDURE — 83735 ASSAY OF MAGNESIUM: CPT | Performed by: INTERNAL MEDICINE

## 2025-07-18 PROCEDURE — 82948 REAGENT STRIP/BLOOD GLUCOSE: CPT

## 2025-07-18 PROCEDURE — 85730 THROMBOPLASTIN TIME PARTIAL: CPT | Performed by: INTERNAL MEDICINE

## 2025-07-18 PROCEDURE — 88305 TISSUE EXAM BY PATHOLOGIST: CPT | Performed by: PATHOLOGY

## 2025-07-18 RX ORDER — FENTANYL CITRATE 50 UG/ML
INJECTION, SOLUTION INTRAMUSCULAR; INTRAVENOUS AS NEEDED
Status: DISCONTINUED | OUTPATIENT
Start: 2025-07-18 | End: 2025-07-18

## 2025-07-18 RX ORDER — LORAZEPAM 0.5 MG/1
0.25 TABLET ORAL ONCE
Status: DISCONTINUED | OUTPATIENT
Start: 2025-07-18 | End: 2025-07-19

## 2025-07-18 RX ORDER — INSULIN LISPRO 100 [IU]/ML
1-5 INJECTION, SOLUTION INTRAVENOUS; SUBCUTANEOUS
Status: DISCONTINUED | OUTPATIENT
Start: 2025-07-19 | End: 2025-07-22 | Stop reason: HOSPADM

## 2025-07-18 RX ORDER — KETOROLAC TROMETHAMINE 30 MG/ML
15 INJECTION, SOLUTION INTRAMUSCULAR; INTRAVENOUS ONCE
Status: COMPLETED | OUTPATIENT
Start: 2025-07-18 | End: 2025-07-18

## 2025-07-18 RX ORDER — METOCLOPRAMIDE HYDROCHLORIDE 5 MG/ML
5 INJECTION INTRAMUSCULAR; INTRAVENOUS EVERY 6 HOURS PRN
Status: DISCONTINUED | OUTPATIENT
Start: 2025-07-18 | End: 2025-07-22

## 2025-07-18 RX ORDER — SODIUM CHLORIDE 9 MG/ML
INJECTION, SOLUTION INTRAVENOUS CONTINUOUS PRN
Status: DISCONTINUED | OUTPATIENT
Start: 2025-07-18 | End: 2025-07-18

## 2025-07-18 RX ORDER — LIDOCAINE HYDROCHLORIDE 20 MG/ML
INJECTION, SOLUTION EPIDURAL; INFILTRATION; INTRACAUDAL; PERINEURAL AS NEEDED
Status: DISCONTINUED | OUTPATIENT
Start: 2025-07-18 | End: 2025-07-18

## 2025-07-18 RX ORDER — HYDROMORPHONE HCL IN WATER/PF 6 MG/30 ML
0.2 PATIENT CONTROLLED ANALGESIA SYRINGE INTRAVENOUS EVERY 6 HOURS PRN
Status: DISCONTINUED | OUTPATIENT
Start: 2025-07-18 | End: 2025-07-18

## 2025-07-18 RX ORDER — SODIUM CHLORIDE, SODIUM LACTATE, POTASSIUM CHLORIDE, CALCIUM CHLORIDE 600; 310; 30; 20 MG/100ML; MG/100ML; MG/100ML; MG/100ML
125 INJECTION, SOLUTION INTRAVENOUS CONTINUOUS
Status: DISCONTINUED | OUTPATIENT
Start: 2025-07-18 | End: 2025-07-18

## 2025-07-18 RX ORDER — ONDANSETRON 2 MG/ML
4 INJECTION INTRAMUSCULAR; INTRAVENOUS ONCE
Status: COMPLETED | OUTPATIENT
Start: 2025-07-18 | End: 2025-07-18

## 2025-07-18 RX ORDER — HYDROMORPHONE HCL/PF 1 MG/ML
0.5 SYRINGE (ML) INJECTION ONCE
Refills: 0 | Status: COMPLETED | OUTPATIENT
Start: 2025-07-18 | End: 2025-07-18

## 2025-07-18 RX ORDER — ACETAMINOPHEN 325 MG/1
975 TABLET ORAL EVERY 6 HOURS PRN
Status: DISCONTINUED | OUTPATIENT
Start: 2025-07-18 | End: 2025-07-18

## 2025-07-18 RX ORDER — MORPHINE SULFATE 4 MG/ML
4 INJECTION, SOLUTION INTRAMUSCULAR; INTRAVENOUS EVERY 4 HOURS PRN
Status: DISCONTINUED | OUTPATIENT
Start: 2025-07-18 | End: 2025-07-20

## 2025-07-18 RX ORDER — OXYCODONE HYDROCHLORIDE 5 MG/1
5 TABLET ORAL EVERY 6 HOURS PRN
Refills: 0 | Status: DISCONTINUED | OUTPATIENT
Start: 2025-07-18 | End: 2025-07-18

## 2025-07-18 RX ORDER — ONDANSETRON 2 MG/ML
4 INJECTION INTRAMUSCULAR; INTRAVENOUS EVERY 6 HOURS PRN
Status: DISCONTINUED | OUTPATIENT
Start: 2025-07-18 | End: 2025-07-22 | Stop reason: HOSPADM

## 2025-07-18 RX ORDER — GLYCOPYRROLATE 0.2 MG/ML
INJECTION INTRAMUSCULAR; INTRAVENOUS AS NEEDED
Status: DISCONTINUED | OUTPATIENT
Start: 2025-07-18 | End: 2025-07-18

## 2025-07-18 RX ORDER — HYDROXYZINE HYDROCHLORIDE 25 MG/1
25 TABLET, FILM COATED ORAL EVERY 6 HOURS PRN
Status: DISCONTINUED | OUTPATIENT
Start: 2025-07-18 | End: 2025-07-22 | Stop reason: HOSPADM

## 2025-07-18 RX ORDER — PROPOFOL 10 MG/ML
INJECTION, EMULSION INTRAVENOUS CONTINUOUS PRN
Status: DISCONTINUED | OUTPATIENT
Start: 2025-07-18 | End: 2025-07-18

## 2025-07-18 RX ORDER — SODIUM CHLORIDE, SODIUM GLUCONATE, SODIUM ACETATE, POTASSIUM CHLORIDE, MAGNESIUM CHLORIDE, SODIUM PHOSPHATE, DIBASIC, AND POTASSIUM PHOSPHATE .53; .5; .37; .037; .03; .012; .00082 G/100ML; G/100ML; G/100ML; G/100ML; G/100ML; G/100ML; G/100ML
100 INJECTION, SOLUTION INTRAVENOUS CONTINUOUS
Status: DISCONTINUED | OUTPATIENT
Start: 2025-07-18 | End: 2025-07-18

## 2025-07-18 RX ORDER — AMLODIPINE BESYLATE 10 MG/1
10 TABLET ORAL DAILY
Status: DISCONTINUED | OUTPATIENT
Start: 2025-07-18 | End: 2025-07-22 | Stop reason: HOSPADM

## 2025-07-18 RX ORDER — HYDRALAZINE HYDROCHLORIDE 20 MG/ML
5 INJECTION INTRAMUSCULAR; INTRAVENOUS EVERY 6 HOURS PRN
Status: DISCONTINUED | OUTPATIENT
Start: 2025-07-18 | End: 2025-07-22 | Stop reason: HOSPADM

## 2025-07-18 RX ORDER — HYDROMORPHONE HCL/PF 1 MG/ML
0.5 SYRINGE (ML) INJECTION ONCE
Status: COMPLETED | OUTPATIENT
Start: 2025-07-18 | End: 2025-07-18

## 2025-07-18 RX ORDER — OXYCODONE HYDROCHLORIDE 5 MG/1
5 TABLET ORAL EVERY 4 HOURS PRN
Refills: 0 | Status: DISCONTINUED | OUTPATIENT
Start: 2025-07-18 | End: 2025-07-22 | Stop reason: HOSPADM

## 2025-07-18 RX ORDER — PANTOPRAZOLE SODIUM 40 MG/1
40 TABLET, DELAYED RELEASE ORAL
Status: DISCONTINUED | OUTPATIENT
Start: 2025-07-18 | End: 2025-07-22 | Stop reason: HOSPADM

## 2025-07-18 RX ORDER — ACETAMINOPHEN 325 MG/1
650 TABLET ORAL EVERY 6 HOURS PRN
Status: DISCONTINUED | OUTPATIENT
Start: 2025-07-18 | End: 2025-07-18

## 2025-07-18 RX ORDER — HYDROMORPHONE HCL/PF 1 MG/ML
0.5 SYRINGE (ML) INJECTION EVERY 6 HOURS PRN
Refills: 0 | Status: DISCONTINUED | OUTPATIENT
Start: 2025-07-18 | End: 2025-07-18

## 2025-07-18 RX ORDER — PROPOFOL 10 MG/ML
INJECTION, EMULSION INTRAVENOUS AS NEEDED
Status: DISCONTINUED | OUTPATIENT
Start: 2025-07-18 | End: 2025-07-18

## 2025-07-18 RX ORDER — LORAZEPAM 2 MG/ML
0.5 INJECTION INTRAMUSCULAR ONCE
Status: COMPLETED | OUTPATIENT
Start: 2025-07-19 | End: 2025-07-19

## 2025-07-18 RX ORDER — LIDOCAINE 50 MG/G
1 PATCH TOPICAL DAILY
Status: DISCONTINUED | OUTPATIENT
Start: 2025-07-19 | End: 2025-07-22 | Stop reason: HOSPADM

## 2025-07-18 RX ORDER — OXYCODONE HYDROCHLORIDE 10 MG/1
10 TABLET ORAL EVERY 4 HOURS PRN
Refills: 0 | Status: DISCONTINUED | OUTPATIENT
Start: 2025-07-18 | End: 2025-07-22 | Stop reason: HOSPADM

## 2025-07-18 RX ORDER — HYDROMORPHONE HCL/PF 1 MG/ML
0.5 SYRINGE (ML) INJECTION EVERY 4 HOURS PRN
Status: DISCONTINUED | OUTPATIENT
Start: 2025-07-18 | End: 2025-07-18

## 2025-07-18 RX ORDER — ATORVASTATIN CALCIUM 40 MG/1
40 TABLET, FILM COATED ORAL DAILY
Status: DISCONTINUED | OUTPATIENT
Start: 2025-07-18 | End: 2025-07-22 | Stop reason: HOSPADM

## 2025-07-18 RX ORDER — INSULIN LISPRO 100 [IU]/ML
1-5 INJECTION, SOLUTION INTRAVENOUS; SUBCUTANEOUS EVERY 6 HOURS SCHEDULED
Status: DISCONTINUED | OUTPATIENT
Start: 2025-07-18 | End: 2025-07-18

## 2025-07-18 RX ORDER — ACETAMINOPHEN 325 MG/1
975 TABLET ORAL EVERY 6 HOURS SCHEDULED
Status: DISCONTINUED | OUTPATIENT
Start: 2025-07-19 | End: 2025-07-22 | Stop reason: HOSPADM

## 2025-07-18 RX ADMIN — GLYCOPYRROLATE 0.2 MG: 0.2 INJECTION, SOLUTION INTRAMUSCULAR; INTRAVENOUS at 14:41

## 2025-07-18 RX ADMIN — PROPOFOL 150 MCG/KG/MIN: 10 INJECTION, EMULSION INTRAVENOUS at 14:45

## 2025-07-18 RX ADMIN — HYDROMORPHONE HYDROCHLORIDE 0.5 MG: 1 INJECTION, SOLUTION INTRAMUSCULAR; INTRAVENOUS; SUBCUTANEOUS at 00:35

## 2025-07-18 RX ADMIN — FENTANYL CITRATE 25 MCG: 50 INJECTION INTRAMUSCULAR; INTRAVENOUS at 14:48

## 2025-07-18 RX ADMIN — LIDOCAINE HYDROCHLORIDE 100 MG: 20 INJECTION, SOLUTION EPIDURAL; INFILTRATION; INTRACAUDAL at 14:44

## 2025-07-18 RX ADMIN — FENTANYL CITRATE 25 MCG: 50 INJECTION INTRAMUSCULAR; INTRAVENOUS at 15:05

## 2025-07-18 RX ADMIN — PANTOPRAZOLE SODIUM 40 MG: 40 TABLET, DELAYED RELEASE ORAL at 05:28

## 2025-07-18 RX ADMIN — AMLODIPINE BESYLATE 10 MG: 10 TABLET ORAL at 09:09

## 2025-07-18 RX ADMIN — SODIUM CHLORIDE, SODIUM GLUCONATE, SODIUM ACETATE, POTASSIUM CHLORIDE, MAGNESIUM CHLORIDE, SODIUM PHOSPHATE, DIBASIC, AND POTASSIUM PHOSPHATE 100 ML/HR: .53; .5; .37; .037; .03; .012; .00082 INJECTION, SOLUTION INTRAVENOUS at 00:36

## 2025-07-18 RX ADMIN — ONDANSETRON 4 MG: 2 INJECTION, SOLUTION INTRAMUSCULAR; INTRAVENOUS at 20:06

## 2025-07-18 RX ADMIN — ATORVASTATIN CALCIUM 40 MG: 40 TABLET, FILM COATED ORAL at 09:09

## 2025-07-18 RX ADMIN — INSULIN LISPRO 1 UNITS: 100 INJECTION, SOLUTION INTRAVENOUS; SUBCUTANEOUS at 00:36

## 2025-07-18 RX ADMIN — PROPOFOL 120 MG: 10 INJECTION, EMULSION INTRAVENOUS at 14:44

## 2025-07-18 RX ADMIN — HYDROMORPHONE HYDROCHLORIDE 0.5 MG: 1 INJECTION, SOLUTION INTRAMUSCULAR; INTRAVENOUS; SUBCUTANEOUS at 23:11

## 2025-07-18 RX ADMIN — HYDROMORPHONE HYDROCHLORIDE 0.2 MG: 0.2 INJECTION, SOLUTION INTRAMUSCULAR; INTRAVENOUS; SUBCUTANEOUS at 16:50

## 2025-07-18 RX ADMIN — HYDRALAZINE HYDROCHLORIDE 5 MG: 20 INJECTION INTRAMUSCULAR; INTRAVENOUS at 20:06

## 2025-07-18 RX ADMIN — METOCLOPRAMIDE 5 MG: 5 INJECTION, SOLUTION INTRAMUSCULAR; INTRAVENOUS at 16:56

## 2025-07-18 RX ADMIN — PROPOFOL 40 MG: 10 INJECTION, EMULSION INTRAVENOUS at 14:48

## 2025-07-18 RX ADMIN — PROPOFOL 40 MG: 10 INJECTION, EMULSION INTRAVENOUS at 14:52

## 2025-07-18 RX ADMIN — SODIUM CHLORIDE, SODIUM LACTATE, POTASSIUM CHLORIDE, AND CALCIUM CHLORIDE 125 ML/HR: .6; .31; .03; .02 INJECTION, SOLUTION INTRAVENOUS at 12:37

## 2025-07-18 RX ADMIN — KETOROLAC TROMETHAMINE 15 MG: 30 INJECTION, SOLUTION INTRAMUSCULAR; INTRAVENOUS at 22:38

## 2025-07-18 RX ADMIN — FENTANYL CITRATE 25 MCG: 50 INJECTION INTRAMUSCULAR; INTRAVENOUS at 14:41

## 2025-07-18 RX ADMIN — HYDROMORPHONE HYDROCHLORIDE 0.5 MG: 1 INJECTION, SOLUTION INTRAMUSCULAR; INTRAVENOUS; SUBCUTANEOUS at 20:06

## 2025-07-18 RX ADMIN — SODIUM CHLORIDE: 0.9 INJECTION, SOLUTION INTRAVENOUS at 14:38

## 2025-07-18 RX ADMIN — ONDANSETRON 4 MG: 2 INJECTION, SOLUTION INTRAMUSCULAR; INTRAVENOUS at 16:09

## 2025-07-18 RX ADMIN — SODIUM CHLORIDE, SODIUM GLUCONATE, SODIUM ACETATE, POTASSIUM CHLORIDE, MAGNESIUM CHLORIDE, SODIUM PHOSPHATE, DIBASIC, AND POTASSIUM PHOSPHATE 100 ML/HR: .53; .5; .37; .037; .03; .012; .00082 INJECTION, SOLUTION INTRAVENOUS at 09:17

## 2025-07-18 RX ADMIN — Medication 7.5 MG: at 19:18

## 2025-07-18 NOTE — CONSULTS
Consultation - Gastroenterology   Name: Elliot Woods 55 y.o. 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  CT imaging obtained this admission shows a 1.8 cm hyperenhancing lesion in the pancreatic neck concern for neoplasm.  Patient had a CT scan on May 31 in [facility] which also showed a 1.5 cm pancreatic lesion and the patient was recommended an EUS at that time however it was not completed. Family history is notable for gastric cancer in the patient's aunt and liver cancer in the patient's mother. As the mass has increased in size and patient continues to be symptomatic patient would benefit from an EUS with biopsies to rule out malignancy as family history is notable for GI related cancer will plan for an EUS today.    - Keep n.p.o.  -EUS today with biopsies to rule out malignancy  -If stable after EUS can discharge home  -Close follow-up with GI outpatient      History of Present Illness   HPI:  Elliot Woods is a 55 y.o. male who presents due to nausea and vomiting.  Past medical history is known for diabetes, hyperlipidemia, hypertension, and recent EUS in April 2023 for abdominal pain.    Patiently recently moved to this area 2 weeks ago from [facility].  Earlier this year he had been endorsing ongoing abdominal pain and was seen at [facility] in March 2025 he underwent a CT scan and EUS in  April 2025 which showed a gastric fundus nodule with biopsies showing spindle cell tumor with differentials possibly being submucosal leiomyoma or a stromal tumor.  Patient continues to have discomfort and underwent a CT scan on May 31 which showed a new 1.5 cm pancreatic lesion in the head which was not seen on previous imaging or EUS.  Patient was recommended to have a EUS however he was moving to [place] when unable to get it done.  Patient states he started having worsening abdominal discomfort with nausea yesterday which prompted him to come to the hospital earlier in the day however he was sent home due to blood work being normal.  But symptoms persisted and he presented back to the hospital.  CT was obtained a 1.8 cm pancreatic lesion in the head was seen patient was admitted.    Currently the patient feels better denies any episodes of nausea or vomiting.  He does state he currently is in the middle of figuring out state insurance as he just moved to weeks ago to this area.  He also endorses significant family history of cancer with liver cancer in his mother and gastric cancer in his aunt.  Patient had a EGD and colonoscopy 6 months ago colonoscopy was notable for polyps was recommended a 3-year follow-up.    Biopsy:04/2025  Stomach, fundus, submucosal nodule, biopsy ([facility]):      -  Spindle cell neoplasm (See comment)    Comment  The biopsy specimen shows minute fragments of submucosal lesion with bland spindle cells.  No mitotic activity is present.  Immunohistochemically, the muscle markers of desmin and smooth muscle actin are positive.  , CD34 and DOG-1 (by [employer]) are equivocal.  S100 is negative.  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  Gastrointestinal stromal tumor is in differential diagnosis.    EUS 04/2025:  ENDOSCOPIC FINDING: :  Esophagogastric landmarks were identified: the Z-line was found at 41 cm, the upper extent of the  gastric folds was found at 41 cm and the site of hiatal narrowing was found at 41 cm from the incisors.  The exam of the esophagus was otherwise normal.  Patchy mildly erythematous mucosa was found in the gastric body and in   the gastric antrum. Biopsies were taken with a cold forceps for   histology.  The examined duodenum was normal. Minimal nodularity was seen with no   overt subepithelial focus seen on careful inspection (potentially   intermittently abutting).  ENDOSONOGRAPHIC FINDING: :  There was no sign of significant endosonographic abnormality involving   the celiac trunk.  There was no sign of significant endosonographic abnormality in the   visualized portion of the liver.  There was no sign of significant endosonographic abnormality in the   visualized portion of the left kidney.  There was no sign of significant endosonographic abnormality in the   visualized portion of the spleen.  Pancreatic parenchymal abnormalities were noted in the entire pancreas.   These consisted of diffuse echogeinicity and heterogenous change. The   duct was poorly visualized.  There was no sign of significant endosonographic abnormality in the main   pancreatic duct. This was within constraints of exam as above   (visualized clearly in the head and non dilated in nature.  There was no sign of significant endosonographic abnormality in the   common bile duct. The maximum diameter of the duct was 3 mm.  A round intramural (subepithelial) lesion was found in the fundus of the   stomach. It was encountered at 2 cm distal to the gastroesophageal   junction. The lesion was hypoechoic. Sonographically, the lesion   appeared to originate from the muscularis propria (Layer 4). The lesion   measured 8.1 mm (in maximum thickness). The lesion also measured 20.2 mm   in diameter. The outer endosonographic borders were well defined. Fine   needle biopsy was performed. Color Doppler imaging was utilized prior to   needle puncture to  confirm a lack of significant vascular structures   within the needle path. One pass was made with the 22 gauge ultrasound   biopsy needle using a transgastric approach. A visible core of tissue   was obtained.              Objective :  Temp:  [98.3 °F (36.8 °C)-99.4 °F (37.4 °C)] 99.4 °F (37.4 °C)  HR:  [66-81] 70  BP: (122-196)/(58-98) 122/58  Resp:  [18-22] 18  SpO2:  [94 %-99 %] 96 %  O2 Device: None (Room air)    Physical Exam  Pulmonary:      Effort: Pulmonary effort is normal.   Abdominal:      General: Abdomen is flat.      Palpations: Abdomen is soft.      Tenderness: There is no abdominal tenderness.     Skin:     General: Skin is warm and dry.     Neurological:      Mental Status: He is alert.     Psychiatric:         Mood and Affect: Mood normal.           Lab Results: I have reviewed the following results:

## 2025-07-18 NOTE — ASSESSMENT & PLAN NOTE
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  CT imaging obtained this admission shows a 1.8 cm hyperenhancing lesion in the pancreatic neck concern for neoplasm.  Patient had a CT scan on May 31 in [facility] which also showed a 1.5 cm pancreatic lesion and the patient was recommended an EUS at that time however it was not completed. Family history is notable for gastric cancer in the patient's aunt and liver cancer in the patient's mother. As the mass has increased in size and patient continues to be symptomatic patient would benefit from an EUS with biopsies to rule out malignancy as family history is notable for GI related cancer will plan for an EUS today.    - Keep n.p.o.  -EUS today with biopsies to rule out malignancy  -If stable after EUS can discharge home  -Close follow-up with GI outpatient

## 2025-07-18 NOTE — ASSESSMENT & PLAN NOTE
Presented with abdominal pain as well as vomiting  Lipase negative earlier on initial visit to ER  Afebrile, no leukocytosis   CT abdomen pelvis with contrast with a 1.8 cm hyperenhancing lesion in the pancreatic neck, raising concern for neoplasm.  MRI of the abdomen with and without contrast recommended for further evaluation.  Patient moved to the area recently but previously followed at [facility].  Reports he has been dealing with symptoms of abdominal pain as well as nausea and vomiting for several months. 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    It does not appear during that EUS that any notes were made regarding any biopsies of any pancreatic masses and unclear if mass was known then  Regardless, patient underwent subsequent CT A/P on 5/31/25 at [facility] which showed a 1.5 cm pancreatic head enhancing nodule suspicious for a neuroendocrine tumor at that time for which EUS and tissue sampling was recommended. Does not appear this was performed yet from review of care everywhere  Noted imaging today with mass increased in size by 0.3 cm since then  GI consulted  S/p EUS unfortunately unable to identify mass   Plan for inpatient MRI abdomen with and without contrast to further evaluate.

## 2025-07-18 NOTE — H&P
H&P - Hospitalist   Name: Elliot Woosd 55 y.o. male I MRN: [id number]  Unit/Bed#: ED 25 I Date of Admission: 7/17/2025   Date of Service: 7/18/2025 I Hospital Day: 0     Assessment & Plan  Pancreatic mass  Presented with abdominal pain as well as vomiting  Lipase negative earlier on initial visit to ER  Afebrile, no leukocytosis   CT abdomen pelvis with contrast with a 1.8 cm hyperenhancing lesion in the pancreatic neck, raising concern for neoplasm.  MRI of the abdomen with and without contrast recommended for further evaluation.  Patient moved to the area recently but previously followed at [facility].  Reports he has been dealing with symptoms of abdominal pain as well as nausea and vomiting for several months. 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    It does not appear during that EUS that any notes were made regarding any biopsies of any pancreatic masses and unclear if mass was known then  Regardless, patient underwent subsequent CT A/P on 5/31/25 at [facility] which showed a 1.5 cm pancreatic head enhancing nodule suspicious for a neuroendocrine tumor at that time for which EUS and tissue sampling was recommended. Does not appear this was performed yet from review of care everywhere  Noted imaging today with mass increased in size by 0.3 cm since then  Admit to medicine.  Order MRI abdomen with and without contrast to further evaluate.  Consult gastroenterology team.  Will keep n.p.o. except for meds overnight.  IV fluids as well as pain control and antiemetics.  Appreciate GI team assistance  Hyperlipidemia  Continue PTA statin  Hypertension  On PTA Norvasc which we will continue and will hold his Benicar overnight  Type 2 diabetes mellitus (HCC)  Lab Results   Component Value Date    HGBA1C 7.5 (H) 07/18/2025       Recent Labs      07/17/25  1125 07/18/25  0034   POCGLU 283* 212*       Blood Sugar Average: Last 72 hrs:  (P) 212    Sliding scale insulin every 6 hours      VTE Pharmacologic Prophylaxis: VTE Score: 2 Low Risk (Score 0-2) - Encourage Ambulation.  Code Status: Level 1 - Full Code   Discussion with family: Patient declined call to .     Anticipated Length of Stay: Patient will be admitted on an inpatient basis with an anticipated length of stay of greater than 2 midnights secondary to pancreatic mass.    History of Present Illness   Chief Complaint: Abdominal pain, nausea, vomiting    Elliot Woods is a 55 y.o. male with a PMH of diabetes, hypertension, hyperlipidemia who presents with ongoing abdominal pain as well as nausea and vomiting which became more intense in the last 24 hours but reports he has been dealing with over the last several months.  Patient moved to the area recently but previously followed at [facility].  Reports he has been dealing with symptoms of abdominal pain as well as nausea and vomiting for several months. 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 It does not appear during that EUS that any notes were made regarding any biopsies of any pancreatic masses and unclear if mass was known then. Regardless, patient underwent subsequent CT A/P on 5/31/25 at [facility] which showed a 1.5 cm pancreatic head enhancing nodule suspicious for a neuroendocrine tumor at that time for which EUS and tissue sampling was recommended.  It does not appear subsequent EUS and tissue sampling was performed.  Patient denies any fevers or chills.    Review of Systems   Gastrointestinal:  Positive for abdominal pain, nausea and vomiting.   All other systems reviewed and are negative.      Historical Information   Past Medical History[1]  Past  Surgical History[2]  Social History[3]  E-Cigarette/Vaping    E-Cigarette Use Never User      E-Cigarette/Vaping Substances     Family history non-contributory  Social History:  Marital Status: Single       Meds/Allergies   I have reviewed home medications with patient personally.  Prior to Admission medications    Medication Sig Start Date End Date Taking? Authorizing Provider   amLODIPine (NORVASC) 10 mg tablet Take 1 tablet (10 mg total) by mouth daily 6/22/22   Luz Maria Hannah MD   atorvastatin (LIPITOR) 40 mg tablet Take 1 tablet (40 mg total) by mouth daily 6/22/22   Luz Maria Hannah MD   hydrocortisone 0.5 % cream Hydrocortisone (Hydrocortisone 0.5 % Cream 28 Gms)  28.35 GM TUBE    Historical Provider, MD   metFORMIN (GLUCOPHAGE-XR) 500 mg 24 hr tablet TAKE 1 TABLET BY MOUTH DAILY X 1 WEEK, THEN INCREASE TO 2 TABS X 1 WEEK, THEN 3 TABS X 1 WEEK, THEN 4 TABS DAILY 11/27/22   Luz Maria Hannah MD   metoclopramide (Reglan) 10 mg tablet Take 1 tablet (10 mg total) by mouth every 6 (six) hours as needed (nausea) for up to 5 days 7/17/25 7/22/25  Rivera Blankenship MD   naproxen (Naprosyn) 500 mg tablet Take 1 tablet (500 mg total) by mouth 2 (two) times a day with meals for 7 days 11/10/21 11/20/21  Taisha Kelly MD   olmesartan (BENICAR) 20 mg tablet Take 1 tablet (20 mg total) by mouth daily 6/22/22   Luz Maria Hannah MD   pantoprazole (PROTONIX) 40 mg tablet TAKE 1 TABLET BY MOUTH BEFORE BREAKFAST 4/17/21   Historical Provider, MD   lidocaine (LIDODERM) 5 % Apply 1 patch topically daily Remove & Discard patch within 12 hours or as directed by MD  Patient not taking: No sig reported 7/28/21 6/22/22  Óscar Santos MD     Allergies   Allergen Reactions    Lisinopril Swelling     Pt states he is not allergic        Objective :  Temp:  [98.3 °F (36.8 °C)-99.4 °F (37.4 °C)] 99.4 °F (37.4 °C)  HR:  [66-81] 66  BP: (176-196)/(79-98) 176/79  Resp:  [18-22] 20  SpO2:   [94 %-99 %] 94 %  O2 Device: None (Room air)    Physical Exam  Vitals reviewed.   Constitutional:       General: He is not in acute distress.  HENT:      Right Ear: External ear normal.      Left Ear: External ear normal.      Nose: No congestion.      Mouth/Throat:      Pharynx: Oropharynx is clear.     Eyes:      Extraocular Movements: Extraocular movements intact.      Pupils: Pupils are equal, round, and reactive to light.       Cardiovascular:      Rate and Rhythm: Normal rate.      Heart sounds: No murmur heard.  Pulmonary:      Effort: Pulmonary effort is normal.   Abdominal:      Tenderness: There is abdominal tenderness. There is no guarding.     Musculoskeletal:         General: No deformity.     Skin:     General: Skin is warm and dry.      Capillary Refill: Capillary refill takes less than 2 seconds.     Neurological:      General: No focal deficit present.      Mental Status: He is alert and oriented to person, place, and time. Mental status is at baseline.                  Lab Results: I have reviewed the following results:  Results from last 7 days   Lab Units 07/17/25 2106 07/17/25  1118   WBC Thousand/uL 10.15 9.18   HEMOGLOBIN g/dL 15.2 15.5   HEMATOCRIT % 46.1 51.1*   PLATELETS Thousands/uL 385 297   SEGS PCT %  --  66   LYMPHO PCT % 10* 25   MONO PCT % 3* 6   EOS PCT % 0 1     Results from last 7 days   Lab Units 07/17/25  2106 07/17/25  1118   SODIUM mmol/L 137 138   POTASSIUM mmol/L 3.7 3.1*   CHLORIDE mmol/L 101 105   CO2 mmol/L 26 22   BUN mg/dL 12 18   CREATININE mg/dL 0.83 0.82   ANION GAP mmol/L 10 11   CALCIUM mg/dL 9.8 10.0   ALBUMIN g/dL  --  4.8   TOTAL BILIRUBIN mg/dL  --  0.65   ALK PHOS U/L  --  103   ALT U/L  --  14   AST U/L  --  16   GLUCOSE RANDOM mg/dL 262* 252*         Results from last 7 days   Lab Units 07/18/25  0034 07/17/25  1125   POC GLUCOSE mg/dl 212* 283*     Lab Results   Component Value Date    HGBA1C 7.5 (H) 07/18/2025    HGBA1C 7.5 (A) 11/01/2022            Imaging Results Review: I reviewed radiology reports from this admission including: CT abdomen/pelvis.  Other Study Results Review: EKG was reviewed.     Administrative Statements   I have spent a total time of 75 minutes in caring for this patient on the day of the visit/encounter including Diagnostic results, Prognosis, and Risks and benefits of tx options.    ** Please Note: This note has been constructed using a voice recognition system. **         [1]   Past Medical History:  Diagnosis Date    Diabetes mellitus (HCC)     Hypertension    [2] No past surgical history on file.  [3]   Social History  Tobacco Use    Smoking status: Never    Smokeless tobacco: Never   Vaping Use    Vaping status: Never Used   Substance and Sexual Activity    Alcohol use: Not Currently    Drug use: Yes     Types: Marijuana

## 2025-07-18 NOTE — ED CARE HANDOFF
Emergency Department Sign Out Note        Sign out and transfer of care from Dr Nick. See Separate Emergency Department note.     The patient, Elliot Woods, was evaluated by the previous provider for vomiting and epigastric pain.    Workup Completed:  Labs    ED Course / Workup Pending (followup):  CT abdomen pelvis        No data recorded                          ED Course as of 07/21/25 1641   Thu Jul 17, 2025 2059 SO: still nauseated despite reglan, pending ct, epigastric pain chronic,    2257 CT abdomen pelvis with contrast  IMPRESSION:     No acute findings in the abdomen or pelvis.     1.8 cm hyperenhancing lesion in the pancreatic neck, raising concern for neoplasm. Recommend MRI of the abdomen with and without contrast for complete evaluation.          Procedures  Medical Decision Making  CT abdomen pelvis concerning for 1.8 cm lesion in the pancreatic neck.  Grown from prior CTA done in May where it was documented as 1.5 cm.  Informed patient's of these results and concerning for possible malignancy.  Patient states he had never been told that he has a mass in the past.  He has no health care at this time as he has just moved from [place] this week.  His nausea has improved however given his concern of no medical follow-up and ongoing nausea, feel like he would benefit from inpatient admission.    Amount and/or Complexity of Data Reviewed  Labs: ordered.  Radiology: ordered. Decision-making details documented in ED Course.    Risk  Prescription drug management.  Decision regarding hospitalization.            Disposition  Final diagnoses:   Vomiting   Abdominal pain   Pancreatic mass     Time reflects when diagnosis was documented in both MDM as applicable and the Disposition within this note       Time User Action Codes Description Comment    7/17/2025 11:34 PM Dextraze, Carole Add [R11.10] Vomiting     7/17/2025 11:34 PM Dextraze, Carole Add [R10.9] Abdominal pain     7/17/2025 11:34 PM Dextraze, Carole  Add [K86.89] Pancreatic mass     7/17/2025 11:34 PM DextrazeBonniea Modify [R11.10] Vomiting     7/17/2025 11:34 PM Dextraze, Carole Modify [K86.89] Pancreatic mass     7/18/2025  3:06 PM KimpelFela Modify [R11.10] Vomiting     7/18/2025  3:06 PM KimpeterryIsraela Modify [R10.9] Abdominal pain     7/18/2025  3:06 PM KimpeFela stewart Modify [K86.89] Pancreatic mass           ED Disposition       ED Disposition   Admit    Condition   Stable    Date/Time   u Jul 17, 2025 11:34 PM    Comment   --             Follow-up Information    None       Current Discharge Medication List        CONTINUE these medications which have NOT CHANGED    Details   amLODIPine (NORVASC) 10 mg tablet Take 1 tablet (10 mg total) by mouth daily  Qty: 90 tablet, Refills: 1    Associated Diagnoses: Primary hypertension      atorvastatin (LIPITOR) 40 mg tablet Take 1 tablet (40 mg total) by mouth daily  Qty: 90 tablet, Refills: 1    Associated Diagnoses: Mixed hyperlipidemia      hydrocortisone 0.5 % cream Hydrocortisone (Hydrocortisone 0.5 % Cream 28 Gms)  28.35 GM TUBE      metFORMIN (GLUCOPHAGE-XR) 500 mg 24 hr tablet TAKE 1 TABLET BY MOUTH DAILY X 1 WEEK, THEN INCREASE TO 2 TABS X 1 WEEK, THEN 3 TABS X 1 WEEK, THEN 4 TABS DAILY  Qty: 360 tablet, Refills: 1    Associated Diagnoses: Type 2 diabetes mellitus with other specified complication, unspecified whether long term insulin use (HCC)      metoclopramide (Reglan) 10 mg tablet Take 1 tablet (10 mg total) by mouth every 6 (six) hours as needed (nausea) for up to 5 days  Qty: 20 tablet, Refills: 0    Associated Diagnoses: Nausea & vomiting; Cannabinoid hyperemesis syndrome      naproxen (Naprosyn) 500 mg tablet Take 1 tablet (500 mg total) by mouth 2 (two) times a day with meals for 7 days  Qty: 14 tablet, Refills: 0    Associated Diagnoses: Acute right-sided low back pain with right-sided sciatica      olmesartan (BENICAR) 20 mg tablet Take 1 tablet (20 mg total) by mouth daily  Qty: 90 tablet,  Refills: 1    Associated Diagnoses: Primary hypertension      pantoprazole (PROTONIX) 40 mg tablet TAKE 1 TABLET BY MOUTH BEFORE BREAKFAST           Outpatient Discharge Orders   MRI abdomen w wo contrast and mrcp   Standing Status: Future Standing Exp. Date: 01/18/26          ED Provider  Electronically Signed by     Carole Urena DO  07/21/25 2020

## 2025-07-18 NOTE — ED ATTENDING ATTESTATION
"I, Pipo Smith MD, saw and evaluated the patient. I have discussed the patient with the resident and agree with the resident's findings, Plan of Care, and MDM as documented in the resident's note, except where noted. All available labs and Radiology studies were reviewed.  I was present for key portions of any procedure(s) performed by the resident and I was immediately available to provide assistance.    At this point I agree with the current assessment done in the Emergency Department.  I have conducted an independent evaluation of this patient a history and physical is as follows:    56 yo male with a history of hyperlipidemia, HTN, and DM returns to the ED for continued nausea, vomiting, diaphoresis, and abdominal pain. The patient was seen in the ED earlier today for the same symptoms. The patient underwent an extensive workup during his earlier visit but did not receive any abdominal imaging. He says he felt \"ok\" when he left but all of his symptoms returned shortly after discharge. He picked up his prescribed antiemetic from the pharmacy but could not stop vomiting so he came came back to the ED. Abdominal pain is \"worse than before\". No chest pain, shortness of breath, lightheadedness, back pain, or flank pain. No other specific complaints. Of note, the patient only moved to [place] 1 week ago and has not yet established care with a medical provider. (+) History of similar episodes in [place].    ROS: per resident physician note    Gen: uncomfortable appearing, AA&Ox3  HEENT: PERRL, EOMI  Neck: supple  CV: RRR  Lungs: CTA B/L  Abdomen: soft, (+) LUQ/epigastric tenderness  Ext: no swelling or deformity  Neuro: 5/5 strength all extremities, sensation grossly intact  Skin: (+) diaphoretic    ED Course  The patient is uncomfortable appearing, markedly diaphoretic, and mildly hypertensive on arrival. (+) Significant epigastric and LUQ tenderness on exam. Unclear etiology of recurrent symptoms. Viral illness vs " pancreatitis vs SBO vs gastritis vs intraabdominal mass? Will repeat basic labs and obtain CT A/P to further evaluate. Toradol, Droperidol, and Zofran ordered. Will continue to monitor closely in the ED. Disposition per workup and reassessment.      Critical Care Time  Procedures

## 2025-07-18 NOTE — ED PROVIDER NOTES
ED Disposition       None          Assessment & Plan       Medical Decision Making  55-year-old male presenting for evaluation of abdominal pain after being seen here earlier today for the same complaint.  On exam, vitals are notable for hypertension at 188/87, however review of vital signs show previous blood pressure of 196/98, and patient noted to be prescribed antihypertensive medication in the outpatient setting.  No headaches, numbness, tingling, facial droop, slurred speech, or other neurologic or strokelike symptoms seen on exam.  Otherwise vitals are within normal limits.  Patient with moderate epigastric tenderness to palpation, no guarding or rebound.  Ordered for CBC, CMP, and CT abdomen pelvis with IV contrast to further evaluate given his worsening pain that warranted a return visit to the ER.  Results of workup not available prior to when care of patient was transferred to Dr. Urena    Amount and/or Complexity of Data Reviewed  Labs: ordered.  Radiology: ordered.    Risk  Prescription drug management.             Medications   ketorolac (TORADOL) injection 15 mg (has no administration in time range)   sodium chloride 0.9 % bolus 1,000 mL (has no administration in time range)   droperidol (INAPSINE) injection 0.625 mg (has no administration in time range)   ondansetron (ZOFRAN-ODT) dispersible tablet 4 mg (has no administration in time range)       ED Risk Strat Scores                    No data recorded        SBIRT 20yo+      Flowsheet Row Most Recent Value   Initial Alcohol Screen: US AUDIT-C     1. How often do you have a drink containing alcohol? 0 Filed at: 07/17/2025 1930   2. How many drinks containing alcohol do you have on a typical day you are drinking?  0 Filed at: 07/17/2025 1930   3a. Male UNDER 65: How often do you have five or more drinks on one occasion? 0 Filed at: 07/17/2025 1930   Audit-C Score 0 Filed at: 07/17/2025 1930   ML: How many times in the past year have you...     Used an illegal drug or used a prescription medication for non-medical reasons? Never Filed at: 07/17/2025 1930                            History of Present Illness       Chief Complaint   Patient presents with    Vomiting     Pt seen and treated earlier for vomiting and was told to come back if things get worse. Denies vomiting but c/o nausea and sweating. States meds he was prescribed aren't working        Past Medical History[1]   Past Surgical History[2]   Family History[3]   Social History[4]   E-Cigarette/Vaping    E-Cigarette Use Never User       E-Cigarette/Vaping Substances      I have reviewed and agree with the history as documented.     55-year-old male presenting for evaluation of abdominal pain, seen here earlier today. Patient reports waking up feeling nauseous this morning with upper abdominal pain around his stomach, and having multiple episodes of non-bloody vomiting. On further questioning he states that he has only been living here in [place] for 1 week, and has had chronic abdominal pain that he was seen every few months in the ER for while previously living in [place].  He states that this pain feels similar to his previous episodes, and therefore came to the ED for evaluation.  Since discharge, patient has passed two normal bowel movements and flatus, and has also picked up and taken his Reglan - which he states has not improved any of his symptoms. No further vomiting since discharge. Given that his pain and nausea were worsening and he could not control or tolerate it, he has returned to the ED for further evaluation.      History provided by:  Patient and medical records   used: No    Vomiting  Associated symptoms: abdominal pain    Associated symptoms: no arthralgias, no chills, no diarrhea, no fever, no headaches and no sore throat        Review of Systems   Constitutional:  Negative for chills and fever.   HENT:  Negative for drooling, facial swelling and  sore throat.    Respiratory:  Negative for apnea, chest tightness and shortness of breath.    Cardiovascular:  Negative for chest pain, palpitations and leg swelling.   Gastrointestinal:  Positive for abdominal pain and nausea. Negative for blood in stool, constipation, diarrhea and vomiting (Previously, resolved since last discharge).   Genitourinary:  Negative for flank pain.   Musculoskeletal:  Negative for arthralgias.   Skin:  Negative for wound.   Neurological:  Negative for dizziness, tremors, seizures, weakness, numbness and headaches.           Objective       ED Triage Vitals [07/17/25 1928]   Temperature Pulse Blood Pressure Respirations SpO2 Patient Position - Orthostatic VS   99.4 °F (37.4 °C) 81 (!) 188/87 20 99 % Sitting      Temp Source Heart Rate Source BP Location FiO2 (%) Pain Score    Oral Monitor Left arm -- 9      Vitals      Date and Time Temp Pulse SpO2 Resp BP Pain Score FACES Pain Rating User   07/17/25 1928 99.4 °F (37.4 °C) 81 99 % 20 188/87 9 -- OB            Physical Exam  Vitals and nursing note reviewed.   Constitutional:       General: He is in acute distress.      Appearance: Normal appearance. He is obese. He is diaphoretic. He is not ill-appearing or toxic-appearing.   HENT:      Head: Normocephalic and atraumatic.      Nose: Nose normal.      Mouth/Throat:      Mouth: Mucous membranes are dry.      Pharynx: Oropharynx is clear.     Cardiovascular:      Rate and Rhythm: Normal rate and regular rhythm.   Pulmonary:      Effort: Pulmonary effort is normal.      Breath sounds: Normal breath sounds.   Abdominal:      Palpations: Abdomen is soft. There is no mass.      Tenderness: There is abdominal tenderness. There is no right CVA tenderness, left CVA tenderness, guarding or rebound.     Musculoskeletal:         General: No deformity or signs of injury. Normal range of motion.     Neurological:      Mental Status: He is alert. Mental status is at baseline.         Results Reviewed        Procedure Component Value Units Date/Time    Basic metabolic panel [[id number]]     Lab Status: No result Specimen: Blood     CBC and differential [[id number]]     Lab Status: No result Specimen: Blood             CT abdomen pelvis with contrast    (Results Pending)       Procedures    ED Medication and Procedure Management   Prior to Admission Medications   Prescriptions Last Dose Informant Patient Reported? Taking?   amLODIPine (NORVASC) 10 mg tablet   No No   Sig: Take 1 tablet (10 mg total) by mouth daily   atorvastatin (LIPITOR) 40 mg tablet   No No   Sig: Take 1 tablet (40 mg total) by mouth daily   hydrocortisone 0.5 % cream  Self Yes No   Sig: Hydrocortisone (Hydrocortisone 0.5 % Cream 28 Gms)  28.35 GM TUBE   metFORMIN (GLUCOPHAGE-XR) 500 mg 24 hr tablet   No No   Sig: TAKE 1 TABLET BY MOUTH DAILY X 1 WEEK, THEN INCREASE TO 2 TABS X 1 WEEK, THEN 3 TABS X 1 WEEK, THEN 4 TABS DAILY   metoclopramide (Reglan) 10 mg tablet   No No   Sig: Take 1 tablet (10 mg total) by mouth every 6 (six) hours as needed (nausea) for up to 5 days   naproxen (Naprosyn) 500 mg tablet   No No   Sig: Take 1 tablet (500 mg total) by mouth 2 (two) times a day with meals for 7 days   olmesartan (BENICAR) 20 mg tablet   No No   Sig: Take 1 tablet (20 mg total) by mouth daily   pantoprazole (PROTONIX) 40 mg tablet  Self Yes No   Sig: TAKE 1 TABLET BY MOUTH BEFORE BREAKFAST      Facility-Administered Medications: None     Patient's Medications   Discharge Prescriptions    No medications on file     No discharge procedures on file.  ED SEPSIS DOCUMENTATION                [1]   Past Medical History:  Diagnosis Date    Diabetes mellitus (HCC)     Hypertension    [2] No past surgical history on file.  [3] No family history on file.  [4]   Social History  Tobacco Use    Smoking status: Never    Smokeless tobacco: Never   Vaping Use    Vaping status: Never Used   Substance Use Topics    Alcohol use: Not Currently    Drug use: Yes     Types:  Marijuana        Sukhjinder Nick MD  07/17/25 [id number]

## 2025-07-18 NOTE — PLAN OF CARE
Problem: PAIN - ADULT  Goal: Verbalizes/displays adequate comfort level or baseline comfort level  Description: Interventions:  - Encourage patient to monitor pain and request assistance  - Assess pain using appropriate pain scale  - Administer analgesics as ordered based on type and severity of pain and evaluate response  - Implement non-pharmacological measures as appropriate and evaluate response  - Consider cultural and social influences on pain and pain management  - Notify physician/advanced practitioner if interventions unsuccessful or patient reports new pain  - Educate patient/family on pain management process including their role and importance of  reporting pain   - Provide non-pharmacologic/complimentary pain relief interventions  Outcome: Progressing     Problem: INFECTION - ADULT  Goal: Absence or prevention of progression during hospitalization  Description: INTERVENTIONS:  - Assess and monitor for signs and symptoms of infection  - Monitor lab/diagnostic results  - Monitor all insertion sites, i.e. indwelling lines, tubes, and drains  - Monitor endotracheal if appropriate and nasal secretions for changes in amount and color  - [place] appropriate cooling/warming therapies per order  - Administer medications as ordered  - Instruct and encourage patient and family to use good hand hygiene technique  - Identify and instruct in appropriate isolation precautions for identified infection/condition  Outcome: Progressing     Problem: SAFETY ADULT  Goal: Patient will remain free of falls  Description: INTERVENTIONS:  - Educate patient/family on patient safety including physical limitations  - Instruct patient to call for assistance with activity   - Consider consulting OT/PT to assist with strengthening/mobility based on AM PAC & JH-HLM score  - Consult OT/PT to assist with strengthening/mobility   - Keep Call bell within reach  - Keep bed low and locked with side rails adjusted as appropriate  - Keep  care items and personal belongings within reach  - Initiate and maintain comfort rounds  - Make Fall Risk Sign visible to staff  - Offer Toileting every 2 Hours, in advance of need  - Apply yellow socks and bracelet for high fall risk patients  - Consider moving patient to room near nurses station  Outcome: Progressing

## 2025-07-18 NOTE — ANESTHESIA POSTPROCEDURE EVALUATION
Post-Op Assessment Note    CV Status:  Stable  Pain Score: 0    Pain management: adequate       Mental Status:  Alert and awake   Hydration Status:  Euvolemic   PONV Controlled:  Controlled   Airway Patency:  Patent     Post Op Vitals Reviewed: Yes    No anethesia notable event occurred.    Staff: CRNA           Last Filed PACU Vitals:  Vitals Value Taken Time   Temp 96.5 °F (35.8 °C) 07/18/25 15:12   Pulse 98 07/18/25 15:12   /94 07/18/25 15:12   Resp 20 07/18/25 15:12   SpO2 97 % 07/18/25 15:12       Modified [place]:     Vitals Value Taken Time   Activity 2 07/18/25 15:13   Respiration 2 07/18/25 15:13   Circulation 2 07/18/25 15:13   Consciousness 1 07/18/25 15:13   Oxygen Saturation 1 07/18/25 15:13     Modified [place] Score: 8

## 2025-07-18 NOTE — ASSESSMENT & PLAN NOTE
Lab Results   Component Value Date    HGBA1C 7.5 (H) 07/18/2025       Recent Labs     07/17/25  1125 07/18/25  0034   POCGLU 283* 212*       Blood Sugar Average: Last 72 hrs:  (P) 212    Sliding scale insulin every 6 hours

## 2025-07-18 NOTE — PROGRESS NOTES
Progress Note - Hospitalist   Name: Elliot Woods 55 y.o. male I MRN: [id number]  Unit/Bed#: -01 I Date of Admission: 7/17/2025   Date of Service: 7/18/2025 I Hospital Day: 0    Assessment & Plan  Pancreatic mass  Presented with abdominal pain as well as vomiting  Lipase negative earlier on initial visit to ER  Afebrile, no leukocytosis   CT abdomen pelvis with contrast with a 1.8 cm hyperenhancing lesion in the pancreatic neck, raising concern for neoplasm.  MRI of the abdomen with and without contrast recommended for further evaluation.  Patient moved to the area recently but previously followed at [facility].  Reports he has been dealing with symptoms of abdominal pain as well as nausea and vomiting for several months. 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    It does not appear during that EUS that any notes were made regarding any biopsies of any pancreatic masses and unclear if mass was known then  Regardless, patient underwent subsequent CT A/P on 5/31/25 at [facility] which showed a 1.5 cm pancreatic head enhancing nodule suspicious for a neuroendocrine tumor at that time for which EUS and tissue sampling was recommended. Does not appear this was performed yet from review of care everywhere  Noted imaging today with mass increased in size by 0.3 cm since then  GI consulted  S/p EUS unfortunately unable to identify mass   Plan for inpatient MRI abdomen with and without contrast to further evaluate.    Hyperlipidemia  Continue PTA statin  Hypertension  On PTA Norvasc   Resume benacar   Type 2 diabetes mellitus (HCC)  Lab Results   Component Value Date    HGBA1C 7.5 (H) 07/18/2025       Recent Labs     07/18/25  0534 07/18/25  1043 07/18/25  1428 07/18/25  1554   POCGLU 126 135 138 164*       Blood Sugar Average: Last 72 hrs:  (P)  155    Hold metformin   C/w SSI coverage   Hypoglycemic protocol    VTE Pharmacologic Prophylaxis: VTE Score: 2 Low Risk (Score 0-2) - Encourage Ambulation.    Mobility:   Basic Mobility Inpatient Raw Score: 24  JH-HLM Goal: 8: Walk 250 feet or more  JH-HLM Achieved: 7: Walk 25 feet or more  JH-HLM Goal achieved. Continue to encourage appropriate mobility.    Patient Centered Rounds: I performed bedside rounds with nursing staff today.   Discussions with Specialists or Other Care Team Provider: d/w RN and GI     Education and Discussions with Family / Patient: Patient declined call to .     Current Length of Stay: 0 day(s)  Current Patient Status: Inpatient   Certification Statement: The patient will continue to require additional inpatient hospital stay due to pending MRI   Discharge Plan: Anticipate discharge in 24-48 hrs to home.    Code Status: Level 1 - Full Code    Subjective   Pt reports he is having some nausea and vomiting after procedure. Denies pain. Denies any other complaints.     Objective :  Temp:  [96.5 °F (35.8 °C)-99.4 °F (37.4 °C)] 98.7 °F (37.1 °C)  HR:  [65-98] 87  BP: (122-194)/() 176/98  Resp:  [16-20] 17  SpO2:  [94 %-99 %] 97 %  O2 Device: Simple mask    Body mass index is 39.85 kg/m².     Input and Output Summary (last 24 hours):     Intake/Output Summary (Last 24 hours) at 7/18/2025 1653  Last data filed at 7/18/2025 1558  Gross per 24 hour   Intake 2968.75 ml   Output --   Net 2968.75 ml       Physical Exam  Constitutional:       General: He is not in acute distress.     Appearance: He is morbidly obese. He is not ill-appearing.     Cardiovascular:      Rate and Rhythm: Normal rate and regular rhythm.      Pulses: Normal pulses.      Heart sounds: Normal heart sounds. No murmur heard.  Pulmonary:      Effort: No respiratory distress.      Breath sounds: Normal breath sounds. No wheezing or rales.   Abdominal:      General: Bowel sounds are normal. There is distension.       Palpations: Abdomen is soft.      Tenderness: There is no abdominal tenderness.     Musculoskeletal:         General: No swelling or tenderness.     Skin:     General: Skin is warm and dry.      Findings: No erythema or rash.     Neurological:      General: No focal deficit present.      Mental Status: He is alert. Mental status is at baseline.     Psychiatric:         Attention and Perception: Attention normal.         Mood and Affect: Mood normal.         Lines/Drains:              Lab Results: I have reviewed the following results:   Results from last 7 days   Lab Units 07/18/25  0437   WBC Thousand/uL 11.98*   HEMOGLOBIN g/dL 13.4   HEMATOCRIT % 41.2   PLATELETS Thousands/uL 329   SEGS PCT % 77*   LYMPHO PCT % 14   MONO PCT % 9   EOS PCT % 0     Results from last 7 days   Lab Units 07/18/25  0437   SODIUM mmol/L 138   POTASSIUM mmol/L 3.6   CHLORIDE mmol/L 103   CO2 mmol/L 25   BUN mg/dL 13   CREATININE mg/dL 0.86   ANION GAP mmol/L 10   CALCIUM mg/dL 9.4   ALBUMIN g/dL 4.1   TOTAL BILIRUBIN mg/dL 0.63   ALK PHOS U/L 90   ALT U/L 16   AST U/L 15   GLUCOSE RANDOM mg/dL 127     Results from last 7 days   Lab Units 07/18/25  0437   INR  1.06     Results from last 7 days   Lab Units 07/18/25  1554 07/18/25  1428 07/18/25  1043 07/18/25  0534 07/18/25  0034 07/17/25  1125   POC GLUCOSE mg/dl 164* 138 135 126 212* 283*     Results from last 7 days   Lab Units 07/18/25  0034   HEMOGLOBIN A1C % 7.5*           Recent Cultures (last 7 days):         Imaging Results Review: I reviewed radiology reports from this admission including: CT abdomen/pelvis.  Other Study Results Review: No additional pertinent studies reviewed.    Last 24 Hours Medication List:     Current Facility-Administered Medications:     acetaminophen (TYLENOL) tablet 650 mg, Q6H PRN    amLODIPine (NORVASC) tablet 10 mg, Daily    atorvastatin (LIPITOR) tablet 40 mg, Daily    HYDROmorphone (DILAUDID) injection 0.5 mg, Q6H PRN    HYDROmorphone HCl  (DILAUDID) injection 0.2 mg, Q6H PRN    [START ON 7/19/2025] insulin lispro (HumALOG/ADMELOG) 100 units/mL subcutaneous injection 1-5 Units, TID With Meals **AND** Fingerstick Glucose (POCT), 4x Daily AC and at bedtime    LORazepam (ATIVAN) tablet 0.25 mg, Once    metoclopramide (REGLAN) injection 5 mg, Q6H PRN    ondansetron (ZOFRAN) injection 4 mg, Q6H PRN    pantoprazole (PROTONIX) EC tablet 40 mg, Early Morning    Administrative Statements   Today, Patient Was Seen By: SHRUTHI Dobbins      **Please Note: This note may have been constructed using a voice recognition system.**

## 2025-07-18 NOTE — ANESTHESIA PREPROCEDURE EVALUATION
Procedure:  ENDOSCOPIC ULTRASOUND (UPPER)    Relevant Problems   CARDIO   (+) Hyperlipidemia   (+) Hypertension      ENDO   (+) Type 2 diabetes mellitus (HCC)      NEURO/PSYCH   (+) Anxiety        Physical Exam    Airway     Mallampati score: III  TM Distance: >3 FB  Neck ROM: full      Cardiovascular  Rhythm: regular, Rate: normal    Dental   No notable dental hx     Pulmonary   Decreased breath sounds    Neurological    He appears awake, alert and oriented x3.      Other Findings  Elliot Woods is a 55 y.o. male with a PMH of diabetes, hypertension, hyperlipidemia who presents with ongoing abdominal pain as well as nausea and vomiting which became more intense in the last 24 hours but reports he has been dealing with over the last several months. Elliot Woods is a 55 y.o. male with a PMH of diabetes, hypertension, hyperlipidemia who presents with ongoing abdominal pain as well as nausea and vomiting which became more intense in the last 24 hours but reports he has been dealing with over the last several months.         Anesthesia Plan  ASA Score- 3     Anesthesia Type- IV sedation with anesthesia with ASA Monitors.         Additional Monitors:     Airway Plan: natural airway.           Plan Factors-Exercise tolerance (METS): >4 METS.    Chart reviewed. EKG reviewed. Imaging results reviewed. Existing labs reviewed. Patient summary reviewed.    Patient is not a current smoker.  Patient did not smoke on day of surgery.            Induction- intravenous.    Postoperative Plan- .   Monitoring Plan - Monitoring plan - standard ASA monitoring      Perioperative Resuscitation Plan - Level 1 - Full Code.       Informed Consent- Anesthetic plan and risks discussed with patient.  I personally reviewed this patient with the CRNA. Discussed and agreed on the Anesthesia Plan with the CRNA..      NPO Status:  No vitals data found for the desired time range.

## 2025-07-18 NOTE — ASSESSMENT & PLAN NOTE
Lab Results   Component Value Date    HGBA1C 7.5 (H) 07/18/2025       Recent Labs     07/18/25  0534 07/18/25  1043 07/18/25  1428 07/18/25  1554   POCGLU 126 135 138 164*       Blood Sugar Average: Last 72 hrs:  (P) 155    Hold metformin   C/w SSI coverage   Hypoglycemic protocol

## 2025-07-19 ENCOUNTER — APPOINTMENT (INPATIENT)
Dept: RADIOLOGY | Facility: HOSPITAL | Age: 55
DRG: 282 | End: 2025-07-19
Payer: COMMERCIAL

## 2025-07-19 PROBLEM — K31.89 GASTRIC NODULE: Status: ACTIVE | Noted: 2025-07-19

## 2025-07-19 LAB
ALBUMIN SERPL BCG-MCNC: 4.3 G/DL (ref 3.5–5)
ALP SERPL-CCNC: 93 U/L (ref 34–104)
ALT SERPL W P-5'-P-CCNC: 19 U/L (ref 7–52)
ANION GAP SERPL CALCULATED.3IONS-SCNC: 9 MMOL/L (ref 4–13)
AST SERPL W P-5'-P-CCNC: 16 U/L (ref 13–39)
BILIRUB SERPL-MCNC: 0.84 MG/DL (ref 0.2–1)
BUN SERPL-MCNC: 17 MG/DL (ref 5–25)
CALCIUM SERPL-MCNC: 9.5 MG/DL (ref 8.4–10.2)
CHLORIDE SERPL-SCNC: 100 MMOL/L (ref 96–108)
CO2 SERPL-SCNC: 27 MMOL/L (ref 21–32)
CREAT SERPL-MCNC: 1.06 MG/DL (ref 0.6–1.3)
ERYTHROCYTE [DISTWIDTH] IN BLOOD BY AUTOMATED COUNT: 13.7 % (ref 11.6–15.1)
GFR SERPL CREATININE-BSD FRML MDRD: 79 ML/MIN/1.73SQ M
GLUCOSE SERPL-MCNC: 175 MG/DL (ref 65–140)
GLUCOSE SERPL-MCNC: 186 MG/DL (ref 65–140)
GLUCOSE SERPL-MCNC: 192 MG/DL (ref 65–140)
GLUCOSE SERPL-MCNC: 220 MG/DL (ref 65–140)
GLUCOSE SERPL-MCNC: 251 MG/DL (ref 65–140)
HCT VFR BLD AUTO: 40 % (ref 36.5–49.3)
HGB BLD-MCNC: 13.2 G/DL (ref 12–17)
MCH RBC QN AUTO: 27.6 PG (ref 26.8–34.3)
MCHC RBC AUTO-ENTMCNC: 33 G/DL (ref 31.4–37.4)
MCV RBC AUTO: 84 FL (ref 82–98)
PLATELET # BLD AUTO: 392 THOUSANDS/UL (ref 149–390)
PMV BLD AUTO: 10.7 FL (ref 8.9–12.7)
POTASSIUM SERPL-SCNC: 3.6 MMOL/L (ref 3.5–5.3)
PROT SERPL-MCNC: 7.2 G/DL (ref 6.4–8.4)
RBC # BLD AUTO: 4.78 MILLION/UL (ref 3.88–5.62)
SODIUM SERPL-SCNC: 136 MMOL/L (ref 135–147)
WBC # BLD AUTO: 13.45 THOUSAND/UL (ref 4.31–10.16)

## 2025-07-19 PROCEDURE — 80053 COMPREHEN METABOLIC PANEL: CPT | Performed by: NURSE PRACTITIONER

## 2025-07-19 PROCEDURE — 82948 REAGENT STRIP/BLOOD GLUCOSE: CPT

## 2025-07-19 PROCEDURE — 99232 SBSQ HOSP IP/OBS MODERATE 35: CPT | Performed by: NURSE PRACTITIONER

## 2025-07-19 PROCEDURE — 85027 COMPLETE CBC AUTOMATED: CPT | Performed by: NURSE PRACTITIONER

## 2025-07-19 PROCEDURE — 74183 MRI ABD W/O CNTR FLWD CNTR: CPT

## 2025-07-19 RX ORDER — LORAZEPAM 2 MG/ML
0.5 INJECTION INTRAMUSCULAR ONCE AS NEEDED
Status: COMPLETED | OUTPATIENT
Start: 2025-07-19 | End: 2025-07-19

## 2025-07-19 RX ORDER — LOSARTAN POTASSIUM 50 MG/1
50 TABLET ORAL DAILY
Status: DISCONTINUED | OUTPATIENT
Start: 2025-07-19 | End: 2025-07-20

## 2025-07-19 RX ORDER — SIMETHICONE 80 MG
80 TABLET,CHEWABLE ORAL ONCE
Status: COMPLETED | OUTPATIENT
Start: 2025-07-19 | End: 2025-07-19

## 2025-07-19 RX ADMIN — PANTOPRAZOLE SODIUM 40 MG: 40 TABLET, DELAYED RELEASE ORAL at 05:45

## 2025-07-19 RX ADMIN — MORPHINE SULFATE 4 MG: 4 INJECTION INTRAVENOUS at 17:00

## 2025-07-19 RX ADMIN — SIMETHICONE 80 MG: 80 TABLET, CHEWABLE ORAL at 19:11

## 2025-07-19 RX ADMIN — HYDROXYZINE HYDROCHLORIDE 25 MG: 25 TABLET, FILM COATED ORAL at 19:12

## 2025-07-19 RX ADMIN — INSULIN LISPRO 1 UNITS: 100 INJECTION, SOLUTION INTRAVENOUS; SUBCUTANEOUS at 15:49

## 2025-07-19 RX ADMIN — ACETAMINOPHEN 975 MG: 325 TABLET ORAL at 05:45

## 2025-07-19 RX ADMIN — AMLODIPINE BESYLATE 10 MG: 10 TABLET ORAL at 08:22

## 2025-07-19 RX ADMIN — MORPHINE SULFATE 4 MG: 4 INJECTION INTRAVENOUS at 08:24

## 2025-07-19 RX ADMIN — OXYCODONE HYDROCHLORIDE 10 MG: 10 TABLET ORAL at 15:49

## 2025-07-19 RX ADMIN — LORAZEPAM 0.5 MG: 2 INJECTION INTRAMUSCULAR; INTRAVENOUS at 23:54

## 2025-07-19 RX ADMIN — ACETAMINOPHEN 975 MG: 325 TABLET ORAL at 11:22

## 2025-07-19 RX ADMIN — LOSARTAN POTASSIUM 50 MG: 50 TABLET, FILM COATED ORAL at 12:23

## 2025-07-19 RX ADMIN — INSULIN LISPRO 1 UNITS: 100 INJECTION, SOLUTION INTRAVENOUS; SUBCUTANEOUS at 11:22

## 2025-07-19 RX ADMIN — ATORVASTATIN CALCIUM 40 MG: 40 TABLET, FILM COATED ORAL at 08:22

## 2025-07-19 RX ADMIN — LIDOCAINE 1 PATCH: 50 PATCH CUTANEOUS at 00:04

## 2025-07-19 RX ADMIN — OXYCODONE HYDROCHLORIDE 10 MG: 10 TABLET ORAL at 00:04

## 2025-07-19 RX ADMIN — MORPHINE SULFATE 4 MG: 4 INJECTION INTRAVENOUS at 21:12

## 2025-07-19 RX ADMIN — OXYCODONE HYDROCHLORIDE 10 MG: 10 TABLET ORAL at 05:45

## 2025-07-19 RX ADMIN — OXYCODONE HYDROCHLORIDE 10 MG: 10 TABLET ORAL at 11:22

## 2025-07-19 RX ADMIN — INSULIN LISPRO 1 UNITS: 100 INJECTION, SOLUTION INTRAVENOUS; SUBCUTANEOUS at 08:21

## 2025-07-19 RX ADMIN — ACETAMINOPHEN 975 MG: 325 TABLET ORAL at 00:04

## 2025-07-19 RX ADMIN — LORAZEPAM 0.5 MG: 2 INJECTION INTRAMUSCULAR; INTRAVENOUS at 00:04

## 2025-07-19 RX ADMIN — ACETAMINOPHEN 975 MG: 325 TABLET ORAL at 17:08

## 2025-07-19 RX ADMIN — OXYCODONE HYDROCHLORIDE 10 MG: 10 TABLET ORAL at 19:11

## 2025-07-19 NOTE — ASSESSMENT & PLAN NOTE
Patient previously evaluated by gastroenterology at [facility].  He underwent an endoscopic ultrasound on 4/23/2025 which revealed a submucosal nodule in the gastric fundus.  Gastric biopsies revealed severely chronic gastritis with no evidence of intestinal metaplasia or H. pylori.  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  Patient does have history of gastric cancer.  Endoscopic ultrasound performed this admission with normal-appearing stomach.

## 2025-07-19 NOTE — ASSESSMENT & PLAN NOTE
Lab Results   Component Value Date    HGBA1C 7.5 (H) 07/18/2025       Recent Labs     07/18/25  1554 07/18/25  2046 07/19/25  0559 07/19/25  1100   POCGLU 164* 262* 192* 220*       Blood Sugar Average: Last 72 hrs:  (P) 181.125    Hold metformin   C/w SSI coverage   Hypoglycemic protocol

## 2025-07-19 NOTE — PROGRESS NOTES
Progress Note - Gastroenterology   Name: Elliot Woods 55 y.o. male I MRN: [id number]  Unit/Bed#: -01 I Date of Admission: 7/17/2025   Date of Service: 7/19/2025 I Hospital Day: 1    Assessment & Plan  Pancreatic mass  Patient is a 55-year-old male with a PMHx of  hypertension, hyperlipidemia, diabetes, and concern for spindle cell neoplasm (see below) who presented with nausea/vomiting and accompanying abdominal pain. CT imaging obtained this admission revealed a 1.8 cm hyperenhancing lesion in the pancreatic neck concern for neoplasm.  Notably, patient previously had a CT scan on May 31 in [facility] which also showed a 1.5 cm pancreatic lesion.  EUS recommended at that time but not completed. Family history is notable for gastric cancer in the patient's aunt and liver cancer in the patient's mother.  Due to ongoing symptoms and increased in mass size, EUS performed inpatient for further evaluation.      Patient status post EUS on 7/18/2025 with advanced endoscopy.  The hyperenhancing lesions seen at the neck of pancreas on initial imaging was not clearly visualized.  There was a subtle hypoechoic area at the neck of the pancreas measuring 1.2 to 1.8 cm depending on the angle.  This could potentially represent lesion on the CT.  FNB was performed and biopsy results pending at this time.  Given inconclusive US findings, recommend that biopsy results be followed and patient obtains MRI imaging in the interim to better delineate CT findings.  This can be performed on an outpatient basis as long as patient's symptoms improved.  He admits to having worsening abdominal pain post EUS procedure.  Pain severe last night but has improved this morning but still remains present.  Recommend ongoing symptomatic management at this time but low threshold for CT imaging if pain worsens.    Plan;  No further inpatient procedures warranted or planned  Okay to begin on clear liquid diet and advance as patient is  tolerating later today  Follow-up results of fine-needle biopsy  Outpatient MRI abdominal imaging, orders have been placed -c an also consider inpatient patient remains admitted  Serial abdominal examinations, low threshold for repeat CT imaging if symptoms worsen  Additional pain and symptom management per primary team  If symptoms improve, would be okay for discharge from GI standpoint  Will require outpatient GI follow-up, message schedulers to arrange follow-up  Gastric nodule  Patient previously evaluated by gastroenterology at [facility].  He underwent an endoscopic ultrasound on 4/23/2025 which revealed a submucosal nodule in the gastric fundus.  Gastric biopsies revealed severely chronic gastritis with no evidence of intestinal metaplasia or H. pylori.  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  Patient does have history of gastric cancer.  Endoscopic ultrasound performed this admission with normal-appearing stomach.    Please contact the SecureChat role for the Gastroenterology service with any questions/concerns.    Subjective   Patient seen and examined at bedside.  Sitting in bed comfortably in no acute distress or visible discomfort.  He does state that after EUS procedure yesterday, he had severe abdominal pain.  Pain in epigastric region and nonradiating.  Denied any nausea/vomiting.  No fever/chills currently but does admit to diaphoresis in the evening.  Ambulating in room.  No bowel movement.  Offers no additional complaints.    Objective :  Temp:  [96.5 °F (35.8 °C)-98.8 °F (37.1 °C)] 98.6 °F (37 °C)  HR:  [] 89  BP: (130-194)/() 130/89  Resp:  [16-20] 16  SpO2:  [93 %-98 %] 96 %  O2 Device: None (Room air)    Physical Exam  Vitals and nursing note reviewed.   Constitutional:       General: He is not in acute distress.     Appearance: He is well-developed.   HENT:      Head: Normocephalic and  atraumatic.     Eyes:      Conjunctiva/sclera: Conjunctivae normal.       Cardiovascular:      Rate and Rhythm: Normal rate and regular rhythm.      Heart sounds: No murmur heard.  Pulmonary:      Effort: Pulmonary effort is normal. No respiratory distress.      Breath sounds: Normal breath sounds.   Abdominal:      Palpations: Abdomen is soft.      Tenderness: There is abdominal tenderness.     Musculoskeletal:         General: No swelling.      Cervical back: Neck supple.     Skin:     General: Skin is warm and dry.      Capillary Refill: Capillary refill takes less than 2 seconds.     Neurological:      Mental Status: He is alert.     Psychiatric:         Mood and Affect: Mood normal.           Lab Results: I have reviewed the following results:CBC/BMP:   .     07/19/25  0614   WBC 13.45*   HGB 13.2   HCT 40.0   *   SODIUM 136   K 3.6      CO2 27   BUN 17   CREATININE 1.06   GLUC 186*    , Creatinine Clearance: Estimated Creatinine Clearance: 88.3 mL/min (by C-G formula based on SCr of 1.06 mg/dL)., LFTs:   .     07/19/25  0614   AST 16   ALT 19   ALB 4.3   TBILI 0.84   ALKPHOS 93    , PTT/INR:No new results in last 24 hours.     Imaging Results Review: I reviewed radiology reports from this admission including: CT abdomen/pelvis and procedure reports.  Other Study Results Review: No additional pertinent studies reviewed.       15-Jul-2019 21:37

## 2025-07-19 NOTE — ASSESSMENT & PLAN NOTE
Patient previously evaluated by gastroenterology at [facility]  He underwent an endoscopic ultrasound on 4/23/2025 which revealed a submucosal nodule in the gastric fundus. Gastric biopsies revealed severely chronic gastritis with no evidence of intestinal metaplasia or H. pylori. 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 Patient does have history of gastric cancer.   Endoscopic ultrasound performed this admission with normal-appearing stomach.

## 2025-07-19 NOTE — ASSESSMENT & PLAN NOTE
Patient is a 55-year-old male with a PMHx of  hypertension, hyperlipidemia, diabetes, and concern for spindle cell neoplasm (see below) who presented with nausea/vomiting and accompanying abdominal pain. CT imaging obtained this admission revealed a 1.8 cm hyperenhancing lesion in the pancreatic neck concern for neoplasm.  Notably, patient previously had a CT scan on May 31 in [facility] which also showed a 1.5 cm pancreatic lesion.  EUS recommended at that time but not completed. Family history is notable for gastric cancer in the patient's aunt and liver cancer in the patient's mother.  Due to ongoing symptoms and increased in mass size, EUS performed inpatient for further evaluation.      Patient status post EUS on 7/18/2025 with advanced endoscopy.  The hyperenhancing lesions seen at the neck of pancreas on initial imaging was not clearly visualized.  There was a subtle hypoechoic area at the neck of the pancreas measuring 1.2 to 1.8 cm depending on the angle.  This could potentially represent lesion on the CT.  FNB was performed and biopsy results pending at this time.  Given inconclusive US findings, recommend that biopsy results be followed and patient obtains MRI imaging in the interim to better delineate CT findings.  This can be performed on an outpatient basis as long as patient's symptoms improved.  He admits to having worsening abdominal pain post EUS procedure.  Pain severe last night but has improved this morning but still remains present.  Recommend ongoing symptomatic management at this time but low threshold for CT imaging if pain worsens.    Plan;  No further inpatient procedures warranted or planned  Okay to begin on clear liquid diet and advance as patient is tolerating later today  Follow-up results of fine-needle biopsy  Outpatient MRI abdominal imaging, orders have been placed -c an also consider inpatient patient remains admitted  Serial abdominal examinations, low threshold for  repeat CT imaging if symptoms worsen  Additional pain and symptom management per primary team  If symptoms improve, would be okay for discharge from GI standpoint  Will require outpatient GI follow-up, message schedulers to arrange follow-up

## 2025-07-19 NOTE — ASSESSMENT & PLAN NOTE
Presented with abdominal pain as well as vomiting. Lipase negative earlier on initial visit to ER. Afebrile, no leukocytosis   CT abdomen pelvis with contrast with a 1.8 cm hyperenhancing lesion in the pancreatic neck, raising concern for neoplasm.  MRI of the abdomen with and without contrast recommended for further evaluation.  Patient moved to the area recently but previously followed at [facility].  Reports he has been dealing with symptoms of abdominal pain as well as nausea and vomiting for several months. 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    It does not appear during that EUS that any notes were made regarding any biopsies of any pancreatic masses and unclear if mass was known then  Regardless, patient underwent subsequent CT A/P on 5/31/25 at [facility] which showed a 1.5 cm pancreatic head enhancing nodule suspicious for a neuroendocrine tumor at that time for which EUS and tissue sampling was recommended. Does not appear this was performed yet from review of care everywhere  Noted imaging today with mass increased in size by 0.3 cm since then  GI consulted  S/p EUS unfortunately unable to identify mass but noted subtle hypoechoic area at the neck of the pancreas which was biopsied   Plan for inpatient MRI abdomen with and without contrast to further evaluate.  GI cleared for clear liquid diet

## 2025-07-19 NOTE — PROGRESS NOTES
Progress Note - Hospitalist   Name: Elliot Woods 55 y.o. male I MRN: [id number]  Unit/Bed#: -01 I Date of Admission: 7/17/2025   Date of Service: 7/19/2025 I Hospital Day: 1    Assessment & Plan  Pancreatic mass  Presented with abdominal pain as well as vomiting. Lipase negative earlier on initial visit to ER. Afebrile, no leukocytosis   CT abdomen pelvis with contrast with a 1.8 cm hyperenhancing lesion in the pancreatic neck, raising concern for neoplasm.  MRI of the abdomen with and without contrast recommended for further evaluation.  Patient moved to the area recently but previously followed at [facility].  Reports he has been dealing with symptoms of abdominal pain as well as nausea and vomiting for several months. 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    It does not appear during that EUS that any notes were made regarding any biopsies of any pancreatic masses and unclear if mass was known then  Regardless, patient underwent subsequent CT A/P on 5/31/25 at [facility] which showed a 1.5 cm pancreatic head enhancing nodule suspicious for a neuroendocrine tumor at that time for which EUS and tissue sampling was recommended. Does not appear this was performed yet from review of care everywhere  Noted imaging today with mass increased in size by 0.3 cm since then  GI consulted  S/p EUS unfortunately unable to identify mass but noted subtle hypoechoic area at the neck of the pancreas which was biopsied   Plan for inpatient MRI abdomen with and without contrast to further evaluate.  GI cleared for clear liquid diet     Gastric nodule  Patient previously evaluated by gastroenterology at [facility]  He underwent an endoscopic ultrasound on 4/23/2025 which revealed a submucosal nodule in the gastric fundus. Gastric  biopsies revealed severely chronic gastritis with no evidence of intestinal metaplasia or H. pylori. 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 Patient does have history of gastric cancer.   Endoscopic ultrasound performed this admission with normal-appearing stomach.   Hyperlipidemia  Continue PTA statin  Hypertension  On PTA Norvasc   PTA on benacar- will start losartan 50mg daily while inpatient   Type 2 diabetes mellitus (HCC)  Lab Results   Component Value Date    HGBA1C 7.5 (H) 07/18/2025       Recent Labs     07/18/25  1554 07/18/25  2046 07/19/25  0559 07/19/25  1100   POCGLU 164* 262* 192* 220*       Blood Sugar Average: Last 72 hrs:  (P) 181.125    Hold metformin   C/w SSI coverage   Hypoglycemic protocol    VTE Pharmacologic Prophylaxis: VTE Score: 2 Low Risk (Score 0-2) - Encourage Ambulation.    Mobility:   Basic Mobility Inpatient Raw Score: 24  JH-HLM Goal: 8: Walk 250 feet or more  JH-HLM Achieved: 8: Walk 250 feet ot more  JH-HLM Goal achieved. Continue to encourage appropriate mobility.    Patient Centered Rounds: I performed bedside rounds with nursing staff today.   Discussions with Specialists or Other Care Team Provider: d/w RN     Education and Discussions with Family / Patient: Updated  (brother) at bedside.    Current Length of Stay: 1 day(s)  Current Patient Status: Inpatient   Certification Statement: The patient will continue to require additional inpatient hospital stay due to pending MRI   Discharge Plan: Anticipate discharge in 24-48 hrs to home.    Code Status: Level 1 - Full Code    Subjective   Pt sitting up in bed, eating lunch-clear liquid diet. Report she was having severe pain last night after procedure but now improved just feeling sore. Denies any vomiting but was having nausea last night. Denies any other complaints.     Objective :  Temp:  [96.5 °F (35.8 °C)-98.8 °F (37.1 °C)] 98.6 °F (37  °C)  HR:  [] 89  BP: (130-194)/() 130/89  Resp:  [16-20] 16  SpO2:  [93 %-98 %] 96 %  O2 Device: None (Room air)    Body mass index is 39.85 kg/m².     Input and Output Summary (last 24 hours):     Intake/Output Summary (Last 24 hours) at 7/19/2025 1149  Last data filed at 7/19/2025 0501  Gross per 24 hour   Intake 2928.75 ml   Output 750 ml   Net 2178.75 ml       Physical Exam  Constitutional:       General: He is not in acute distress.     Appearance: He is morbidly obese.     Cardiovascular:      Rate and Rhythm: Normal rate and regular rhythm.      Pulses: Normal pulses.      Heart sounds: Normal heart sounds. No murmur heard.  Pulmonary:      Effort: No respiratory distress.      Breath sounds: Normal breath sounds. No wheezing or rales.   Abdominal:      General: Bowel sounds are normal. There is no distension.      Palpations: Abdomen is soft.      Tenderness: There is abdominal tenderness (LUQ).     Musculoskeletal:         General: No swelling or tenderness.     Skin:     General: Skin is warm and dry.      Findings: No erythema or rash.     Neurological:      General: No focal deficit present.      Mental Status: He is alert. Mental status is at baseline.     Psychiatric:         Attention and Perception: Attention normal.         Mood and Affect: Mood normal.         Lines/Drains:              Lab Results: I have reviewed the following results:   Results from last 7 days   Lab Units 07/19/25  0614 07/18/25  0437   WBC Thousand/uL 13.45* 11.98*   HEMOGLOBIN g/dL 13.2 13.4   HEMATOCRIT % 40.0 41.2   PLATELETS Thousands/uL 392* 329   SEGS PCT %  --  77*   LYMPHO PCT %  --  14   MONO PCT %  --  9   EOS PCT %  --  0     Results from last 7 days   Lab Units 07/19/25  0614   SODIUM mmol/L 136   POTASSIUM mmol/L 3.6   CHLORIDE mmol/L 100   CO2 mmol/L 27   BUN mg/dL 17   CREATININE mg/dL 1.06   ANION GAP mmol/L 9   CALCIUM mg/dL 9.5   ALBUMIN g/dL 4.3   TOTAL BILIRUBIN mg/dL 0.84   ALK PHOS U/L 93    ALT U/L 19   AST U/L 16   GLUCOSE RANDOM mg/dL 186*     Results from last 7 days   Lab Units 07/18/25  0437   INR  1.06     Results from last 7 days   Lab Units 07/19/25  1100 07/19/25  0559 07/18/25  2046 07/18/25  1554 07/18/25  1428 07/18/25  1043 07/18/25  0534 07/18/25  0034 07/17/25  1125   POC GLUCOSE mg/dl 220* 192* 262* 164* 138 135 126 212* 283*     Results from last 7 days   Lab Units 07/18/25  0034   HEMOGLOBIN A1C % 7.5*           Recent Cultures (last 7 days):         Imaging Results Review: I reviewed radiology reports from this admission including: CT abdomen/pelvis and procedure reports.  Other Study Results Review: No additional pertinent studies reviewed.    Last 24 Hours Medication List:     Current Facility-Administered Medications:     acetaminophen (TYLENOL) tablet 975 mg, Q6H DEJUAN    amLODIPine (NORVASC) tablet 10 mg, Daily    atorvastatin (LIPITOR) tablet 40 mg, Daily    hydrALAZINE (APRESOLINE) injection 5 mg, Q6H PRN    hydrOXYzine HCL (ATARAX) tablet 25 mg, Q6H PRN    insulin lispro (HumALOG/ADMELOG) 100 units/mL subcutaneous injection 1-5 Units, TID With Meals **AND** Fingerstick Glucose (POCT), 4x Daily AC and at bedtime    lidocaine (LIDODERM) 5 % patch 1 patch, Daily    LORazepam (ATIVAN) tablet 0.25 mg, Once    metoclopramide (REGLAN) injection 5 mg, Q6H PRN    morphine injection 4 mg, Q4H PRN    ondansetron (ZOFRAN) injection 4 mg, Q6H PRN    oxyCODONE (ROXICODONE) IR tablet 5 mg, Q4H PRN **OR** oxyCODONE (ROXICODONE) immediate release tablet 10 mg, Q4H PRN    pantoprazole (PROTONIX) EC tablet 40 mg, Early Morning    Administrative Statements   Today, Patient Was Seen By: SHRUTHI Dobbins      **Please Note: This note may have been constructed using a voice recognition system.**

## 2025-07-20 PROBLEM — S36.209A: Status: ACTIVE | Noted: 2025-07-20

## 2025-07-20 PROBLEM — N17.9 AKI (ACUTE KIDNEY INJURY) (HCC): Status: ACTIVE | Noted: 2025-07-20

## 2025-07-20 PROBLEM — R17 SERUM TOTAL BILIRUBIN ELEVATED: Status: ACTIVE | Noted: 2025-07-20

## 2025-07-20 PROBLEM — D72.829 LEUKOCYTOSIS: Status: ACTIVE | Noted: 2025-07-20

## 2025-07-20 LAB
ALBUMIN SERPL BCG-MCNC: 3.8 G/DL (ref 3.5–5)
ALP SERPL-CCNC: 85 U/L (ref 34–104)
ALT SERPL W P-5'-P-CCNC: 14 U/L (ref 7–52)
ANION GAP SERPL CALCULATED.3IONS-SCNC: 10 MMOL/L (ref 4–13)
AST SERPL W P-5'-P-CCNC: 14 U/L (ref 13–39)
BILIRUB SERPL-MCNC: 1.6 MG/DL (ref 0.2–1)
BUN SERPL-MCNC: 26 MG/DL (ref 5–25)
CALCIUM SERPL-MCNC: 9.7 MG/DL (ref 8.4–10.2)
CHLORIDE SERPL-SCNC: 100 MMOL/L (ref 96–108)
CO2 SERPL-SCNC: 27 MMOL/L (ref 21–32)
CREAT SERPL-MCNC: 1.69 MG/DL (ref 0.6–1.3)
ERYTHROCYTE [DISTWIDTH] IN BLOOD BY AUTOMATED COUNT: 13.4 % (ref 11.6–15.1)
GFR SERPL CREATININE-BSD FRML MDRD: 45 ML/MIN/1.73SQ M
GLUCOSE SERPL-MCNC: 137 MG/DL (ref 65–140)
GLUCOSE SERPL-MCNC: 141 MG/DL (ref 65–140)
GLUCOSE SERPL-MCNC: 168 MG/DL (ref 65–140)
GLUCOSE SERPL-MCNC: 170 MG/DL (ref 65–140)
GLUCOSE SERPL-MCNC: 174 MG/DL (ref 65–140)
GLUCOSE SERPL-MCNC: 175 MG/DL (ref 65–140)
HCT VFR BLD AUTO: 36.6 % (ref 36.5–49.3)
HGB BLD-MCNC: 12.2 G/DL (ref 12–17)
LACTATE SERPL-SCNC: 1.1 MMOL/L (ref 0.5–2)
MCH RBC QN AUTO: 28 PG (ref 26.8–34.3)
MCHC RBC AUTO-ENTMCNC: 33.3 G/DL (ref 31.4–37.4)
MCV RBC AUTO: 84 FL (ref 82–98)
PLATELET # BLD AUTO: 301 THOUSANDS/UL (ref 149–390)
PMV BLD AUTO: 10.6 FL (ref 8.9–12.7)
POTASSIUM SERPL-SCNC: 3.8 MMOL/L (ref 3.5–5.3)
PROT SERPL-MCNC: 6.9 G/DL (ref 6.4–8.4)
RBC # BLD AUTO: 4.35 MILLION/UL (ref 3.88–5.62)
SODIUM SERPL-SCNC: 137 MMOL/L (ref 135–147)
WBC # BLD AUTO: 17.26 THOUSAND/UL (ref 4.31–10.16)

## 2025-07-20 PROCEDURE — 82948 REAGENT STRIP/BLOOD GLUCOSE: CPT

## 2025-07-20 PROCEDURE — 85027 COMPLETE CBC AUTOMATED: CPT | Performed by: NURSE PRACTITIONER

## 2025-07-20 PROCEDURE — 99232 SBSQ HOSP IP/OBS MODERATE 35: CPT | Performed by: NURSE PRACTITIONER

## 2025-07-20 PROCEDURE — 99254 IP/OBS CNSLTJ NEW/EST MOD 60: CPT | Performed by: ANESTHESIOLOGY

## 2025-07-20 PROCEDURE — 83605 ASSAY OF LACTIC ACID: CPT | Performed by: STUDENT IN AN ORGANIZED HEALTH CARE EDUCATION/TRAINING PROGRAM

## 2025-07-20 PROCEDURE — 80053 COMPREHEN METABOLIC PANEL: CPT | Performed by: NURSE PRACTITIONER

## 2025-07-20 PROCEDURE — 87040 BLOOD CULTURE FOR BACTERIA: CPT | Performed by: STUDENT IN AN ORGANIZED HEALTH CARE EDUCATION/TRAINING PROGRAM

## 2025-07-20 PROCEDURE — A9585 GADOBUTROL INJECTION: HCPCS | Performed by: INTERNAL MEDICINE

## 2025-07-20 PROCEDURE — 99255 IP/OBS CONSLTJ NEW/EST HI 80: CPT | Performed by: STUDENT IN AN ORGANIZED HEALTH CARE EDUCATION/TRAINING PROGRAM

## 2025-07-20 PROCEDURE — 84145 PROCALCITONIN (PCT): CPT | Performed by: STUDENT IN AN ORGANIZED HEALTH CARE EDUCATION/TRAINING PROGRAM

## 2025-07-20 RX ORDER — GADOBUTROL 604.72 MG/ML
9 INJECTION INTRAVENOUS
Status: COMPLETED | OUTPATIENT
Start: 2025-07-20 | End: 2025-07-20

## 2025-07-20 RX ORDER — BISACODYL 10 MG
10 SUPPOSITORY, RECTAL RECTAL DAILY PRN
Status: DISCONTINUED | OUTPATIENT
Start: 2025-07-20 | End: 2025-07-22 | Stop reason: HOSPADM

## 2025-07-20 RX ORDER — SIMETHICONE 80 MG
80 TABLET,CHEWABLE ORAL EVERY 6 HOURS PRN
Status: DISCONTINUED | OUTPATIENT
Start: 2025-07-20 | End: 2025-07-22 | Stop reason: HOSPADM

## 2025-07-20 RX ORDER — DOCUSATE SODIUM 100 MG/1
100 CAPSULE, LIQUID FILLED ORAL 2 TIMES DAILY
Status: DISCONTINUED | OUTPATIENT
Start: 2025-07-20 | End: 2025-07-22 | Stop reason: HOSPADM

## 2025-07-20 RX ORDER — POLYETHYLENE GLYCOL 3350 17 G/17G
17 POWDER, FOR SOLUTION ORAL DAILY
Status: DISCONTINUED | OUTPATIENT
Start: 2025-07-20 | End: 2025-07-22 | Stop reason: HOSPADM

## 2025-07-20 RX ORDER — SENNOSIDES 8.6 MG
2 TABLET ORAL DAILY PRN
Status: DISCONTINUED | OUTPATIENT
Start: 2025-07-20 | End: 2025-07-22 | Stop reason: HOSPADM

## 2025-07-20 RX ORDER — SODIUM CHLORIDE, SODIUM GLUCONATE, SODIUM ACETATE, POTASSIUM CHLORIDE, MAGNESIUM CHLORIDE, SODIUM PHOSPHATE, DIBASIC, AND POTASSIUM PHOSPHATE .53; .5; .37; .037; .03; .012; .00082 G/100ML; G/100ML; G/100ML; G/100ML; G/100ML; G/100ML; G/100ML
125 INJECTION, SOLUTION INTRAVENOUS CONTINUOUS
Status: DISCONTINUED | OUTPATIENT
Start: 2025-07-20 | End: 2025-07-21

## 2025-07-20 RX ADMIN — SODIUM CHLORIDE, SODIUM GLUCONATE, SODIUM ACETATE, POTASSIUM CHLORIDE, MAGNESIUM CHLORIDE, SODIUM PHOSPHATE, DIBASIC, AND POTASSIUM PHOSPHATE 125 ML/HR: .53; .5; .37; .037; .03; .012; .00082 INJECTION, SOLUTION INTRAVENOUS at 08:06

## 2025-07-20 RX ADMIN — INSULIN LISPRO 1 UNITS: 100 INJECTION, SOLUTION INTRAVENOUS; SUBCUTANEOUS at 11:12

## 2025-07-20 RX ADMIN — ACETAMINOPHEN 975 MG: 325 TABLET ORAL at 00:05

## 2025-07-20 RX ADMIN — POLYETHYLENE GLYCOL 3350 17 G: 17 POWDER, FOR SOLUTION ORAL at 11:11

## 2025-07-20 RX ADMIN — PANTOPRAZOLE SODIUM 40 MG: 40 TABLET, DELAYED RELEASE ORAL at 06:03

## 2025-07-20 RX ADMIN — LOSARTAN POTASSIUM 50 MG: 50 TABLET, FILM COATED ORAL at 08:06

## 2025-07-20 RX ADMIN — SODIUM CHLORIDE, SODIUM GLUCONATE, SODIUM ACETATE, POTASSIUM CHLORIDE, MAGNESIUM CHLORIDE, SODIUM PHOSPHATE, DIBASIC, AND POTASSIUM PHOSPHATE 125 ML/HR: .53; .5; .37; .037; .03; .012; .00082 INJECTION, SOLUTION INTRAVENOUS at 17:08

## 2025-07-20 RX ADMIN — LIDOCAINE 1 PATCH: 50 PATCH CUTANEOUS at 01:41

## 2025-07-20 RX ADMIN — OXYCODONE HYDROCHLORIDE 10 MG: 10 TABLET ORAL at 00:05

## 2025-07-20 RX ADMIN — MORPHINE SULFATE 4 MG: 4 INJECTION INTRAVENOUS at 06:04

## 2025-07-20 RX ADMIN — SODIUM CHLORIDE 250 ML: 0.9 INJECTION, SOLUTION INTRAVENOUS at 23:15

## 2025-07-20 RX ADMIN — OXYCODONE HYDROCHLORIDE 10 MG: 10 TABLET ORAL at 08:05

## 2025-07-20 RX ADMIN — GADOBUTROL 9 ML: 604.72 INJECTION INTRAVENOUS at 00:58

## 2025-07-20 RX ADMIN — OXYCODONE HYDROCHLORIDE 10 MG: 10 TABLET ORAL at 22:02

## 2025-07-20 RX ADMIN — DOCUSATE SODIUM 100 MG: 100 CAPSULE, LIQUID FILLED ORAL at 11:11

## 2025-07-20 RX ADMIN — ACETAMINOPHEN 975 MG: 325 TABLET ORAL at 11:11

## 2025-07-20 RX ADMIN — AMLODIPINE BESYLATE 10 MG: 10 TABLET ORAL at 08:06

## 2025-07-20 RX ADMIN — ACETAMINOPHEN 975 MG: 325 TABLET ORAL at 21:53

## 2025-07-20 RX ADMIN — OXYCODONE HYDROCHLORIDE 10 MG: 10 TABLET ORAL at 17:11

## 2025-07-20 RX ADMIN — DOCUSATE SODIUM 100 MG: 100 CAPSULE, LIQUID FILLED ORAL at 17:08

## 2025-07-20 RX ADMIN — ATORVASTATIN CALCIUM 40 MG: 40 TABLET, FILM COATED ORAL at 08:06

## 2025-07-20 RX ADMIN — ACETAMINOPHEN 975 MG: 325 TABLET ORAL at 06:03

## 2025-07-20 RX ADMIN — INSULIN LISPRO 1 UNITS: 100 INJECTION, SOLUTION INTRAVENOUS; SUBCUTANEOUS at 08:06

## 2025-07-20 NOTE — ASSESSMENT & PLAN NOTE
Patient previously evaluated by gastroenterology at [facility].  He underwent an endoscopic ultrasound on 4/23/2025 which revealed a submucosal nodule in the gastric fundus.  Gastric biopsies revealed severely chronic gastritis with no evidence of intestinal metaplasia or H. pylori.  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  Patient does have history of gastric cancer.  Endoscopic ultrasound performed this admission with normal-appearing stomach.

## 2025-07-20 NOTE — ASSESSMENT & PLAN NOTE
Patient previously evaluated by gastroenterology at [facility]  He underwent an endoscopic ultrasound on 4/23/2025 which revealed a submucosal nodule in the gastric fundus. Gastric biopsies revealed severely chronic gastritis with no evidence of intestinal metaplasia or H. pylori. 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 Patient does have history of gastric cancer.   Endoscopic ultrasound performed this admission with normal-appearing stomach.

## 2025-07-20 NOTE — ASSESSMENT & PLAN NOTE
On PTA [facility]   PTA on benacar- started losartan 50mg daily 7/19 and noted to have COLLIN today, will hold and monitor off of ARB  Avoid hypotension and nephrotoxic drugs

## 2025-07-20 NOTE — ASSESSMENT & PLAN NOTE
Noted to be trending up: 13.45--> 17.23  Could be reactive in the setting of above   Monitor temps, TM 99.1 in 24hrs  Check blood cultures if temp > 100.7  Monitor off antibiotics

## 2025-07-20 NOTE — PROGRESS NOTES
Progress Note - Hospitalist   Name: Elliot Woods 55 y.o. male I MRN: [id number]  Unit/Bed#: -01 I Date of Admission: 7/17/2025   Date of Service: 7/20/2025 I Hospital Day: 2    Assessment & Plan  Pancreatic mass  Presented with abd pain and vomiting, lipase negative. Patient moved to the area recently but previously followed at [facility].  Noted to have abd pain, N/V for several months. 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  It does not appear during that EUS that any notes were made regarding any biopsies of any pancreatic masses and unclear if mass was known then. Subsequent CT A/P on 5/31/25 at [facility] which showed a 1.5 cm pancreatic head enhancing nodule suspicious for a neuroendocrine tumor at that time for which EUS and tissue sampling was recommended but wasn't yet performed  CT abdomen pelvis with contrast with a 1.8 cm hyperenhancing lesion in the pancreatic neck, raising concern for neoplasm.    GI consulted   S/p EUS 7/18 unfortunately unable to identify mass but noted subtle hypoechoic area at the neck of the pancreas which was biopsied   MRI of the abdomen with and without contrast 7/20/25- Corresponding to CT findings, there is a 1.4 cm well-circumscribed, hyperenhancing pancreatic nodule, likely neuroendocrine tumor. There is a new 9.0 x 7.6 x 7.2 cm nonenhancing mass abutting the pancreatic tail, consistent with hematoma, without active hemorrhage. There is also fat stranding surrounding the hematoma and pancreatic tail. Please correlate with laboratory findings for evidence of acute pancreatitis.  Per GI continue to monitor given hematoma- hgb stable, abdominal pain improving. no evidence of active bleeding  leukocytosis likely reactive, will observe off of antibiotics at this time.   Cleared to start upgrading  diet- was tolerating a clear liquid diet   Gastric nodule  Patient previously evaluated by gastroenterology at [facility]  He underwent an endoscopic ultrasound on 4/23/2025 which revealed a submucosal nodule in the gastric fundus. Gastric biopsies revealed severely chronic gastritis with no evidence of intestinal metaplasia or H. pylori. 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 Patient does have history of gastric cancer.   Endoscopic ultrasound performed this admission with normal-appearing stomach.   Hyperlipidemia  T bili elevated today- hold PTA statin  Hypertension  On PTA Norvasc   PTA on benacar- started losartan 50mg daily 7/19 and noted to have COLLIN today, will hold and monitor off of ARB  Avoid hypotension and nephrotoxic drugs   Type 2 diabetes mellitus (HCC)  Lab Results   Component Value Date    HGBA1C 7.5 (H) 07/18/2025       Recent Labs     07/19/25  1100 07/19/25  1534 07/19/25  2057 07/20/25  0550   POCGLU 220* 175* 251* 175*       Blood Sugar Average: Last 72 hrs:  (P) [id number]    Hold metformin   C/w SSI coverage   Hypoglycemic protocol  Class 3 severe obesity due to excess calories with serious comorbidity and body mass index (BMI) of 40.0 to 44.9 in adult  BMI 39.85  Affecting all facets of life  Encourage diet weight loss  COLLIN (acute kidney injury) (HCC)  Lab Results   Component Value Date    CREATININE 1.69 (H) 07/20/2025    CREATININE 1.06 07/19/2025    CREATININE 0.86 07/18/2025   In the setting of dye exposure, poor PO intake/prerenal d/t abd pain/n/v and ARB use   Start IVFs today- isolyte 125ml/hr  Retention protocol- PVR this am was (-)   Hold ARB   Check am bmp   Avoid hypotension or nephrotoxic drugs     Serum total bilirubin elevated  MRI of the abdomen with and without contrast 7/20/25- Corresponding to CT findings, there is a 1.4 cm well-circumscribed, hyperenhancing pancreatic  nodule, likely neuroendocrine tumor. There is a new 9.0 x 7.6 x 7.2 cm nonenhancing mass abutting the pancreatic tail, consistent with hematoma, without active hemorrhage. There is also fat stranding surrounding the hematoma and pancreatic tail. Please correlate with laboratory findings for evidence of acute pancreatitis.  Monitor t bili level   Hold statin   Leukocytosis  Noted to be trending up: 13.45--> 17.23  Could be reactive in the setting of above   Monitor temps, TM 99.1 in 24hrs  Check blood cultures if temp > 100.7  Monitor off antibiotics     VTE Pharmacologic Prophylaxis: VTE Score: 2 Low Risk (Score 0-2) - Encourage Ambulation.    Mobility:   Basic Mobility Inpatient Raw Score: 24  JH-HLM Goal: 8: Walk 250 feet or more  JH-HLM Achieved: 6: Walk 10 steps or more  JH-HLM Goal achieved. Continue to encourage appropriate mobility.    Patient Centered Rounds: I performed bedside rounds with nursing staff today.   Discussions with Specialists or Other Care Team Provider: Discussed with RN and GI    Education and Discussions with Family / Patient: Patient declined call to .     Current Length of Stay: 2 day(s)  Current Patient Status: Inpatient   Certification Statement: The patient will continue to require additional inpatient hospital stay due to monitor hematoma/COLLIN  Discharge Plan: Anticipate discharge in 24-48 hrs to home.    Code Status: Level 1 - Full Code    Subjective   Patient reports that his abdominal pain has improved since yesterday rating his pain to be a 7 out of 10 in the left upper quadrant.  Reports that nausea and vomiting improved denies any other complaints at this time    Objective :  Temp:  [97.9 °F (36.6 °C)-99.1 °F (37.3 °C)] 97.9 °F (36.6 °C)  HR:  [] 96  BP: (140-150)/(77-96) 150/77  Resp:  [17-18] 18  SpO2:  [91 %-94 %] 94 %  O2 Device: None (Room air)    Body mass index is 39.85 kg/m².     Input and Output Summary (last 24 hours):     Intake/Output Summary  (Last 24 hours) at 7/20/2025 1008  Last data filed at 7/20/2025 0826  Gross per 24 hour   Intake --   Output 150 ml   Net -150 ml       Physical Exam  Constitutional:       General: He is not in acute distress.     Appearance: He is morbidly obese. He is not ill-appearing.     Cardiovascular:      Rate and Rhythm: Normal rate and regular rhythm.      Pulses: Normal pulses.      Heart sounds: Normal heart sounds. No murmur heard.  Pulmonary:      Effort: No respiratory distress.      Breath sounds: Normal breath sounds. No wheezing or rales.   Abdominal:      General: Bowel sounds are normal. There is distension.      Tenderness: There is abdominal tenderness (Left upper quadrant).     Musculoskeletal:         General: No swelling or tenderness.     Skin:     General: Skin is warm and dry.      Findings: No erythema or rash.     Neurological:      General: No focal deficit present.      Mental Status: He is alert. Mental status is at baseline.     Psychiatric:         Attention and Perception: Attention normal.         Mood and Affect: Mood normal.         Lines/Drains:              Lab Results: I have reviewed the following results:   Results from last 7 days   Lab Units 07/20/25  0444 07/19/25  0614 07/18/25  0437   WBC Thousand/uL 17.26*   < > 11.98*   HEMOGLOBIN g/dL 12.2   < > 13.4   HEMATOCRIT % 36.6   < > 41.2   PLATELETS Thousands/uL 301   < > 329   SEGS PCT %  --   --  77*   LYMPHO PCT %  --   --  14   MONO PCT %  --   --  9   EOS PCT %  --   --  0    < > = values in this interval not displayed.     Results from last 7 days   Lab Units 07/20/25 0444   SODIUM mmol/L 137   POTASSIUM mmol/L 3.8   CHLORIDE mmol/L 100   CO2 mmol/L 27   BUN mg/dL 26*   CREATININE mg/dL 1.69*   ANION GAP mmol/L 10   CALCIUM mg/dL 9.7   ALBUMIN g/dL 3.8   TOTAL BILIRUBIN mg/dL 1.60*   ALK PHOS U/L 85   ALT U/L 14   AST U/L 14   GLUCOSE RANDOM mg/dL 168*     Results from last 7 days   Lab Units 07/18/25  0437   INR  1.06     Results  from last 7 days   Lab Units 07/20/25  0550 07/19/25  2057 07/19/25  1534 07/19/25  1100 07/19/25  0559 07/18/25  2046 07/18/25  1554 07/18/25  1428 07/18/25  1043 07/18/25  0534 07/18/25  0034 07/17/25  1125   POC GLUCOSE mg/dl 175* 251* 175* 220* 192* 262* 164* 138 135 126 212* 283*     Results from last 7 days   Lab Units 07/18/25  0034   HEMOGLOBIN A1C % 7.5*           Recent Cultures (last 7 days):         Imaging Results Review: I reviewed radiology reports from this admission including: MRI of the abdomen.      Last 24 Hours Medication List:     Current Facility-Administered Medications:     acetaminophen (TYLENOL) tablet 975 mg, Q6H DEJUAN    amLODIPine (NORVASC) tablet 10 mg, Daily    atorvastatin (LIPITOR) tablet 40 mg, Daily    hydrALAZINE (APRESOLINE) injection 5 mg, Q6H PRN    hydrOXYzine HCL (ATARAX) tablet 25 mg, Q6H PRN    insulin lispro (HumALOG/ADMELOG) 100 units/mL subcutaneous injection 1-5 Units, TID With Meals **AND** Fingerstick Glucose (POCT), 4x Daily AC and at bedtime    lidocaine (LIDODERM) 5 % patch 1 patch, Daily    metoclopramide (REGLAN) injection 5 mg, Q6H PRN    morphine injection 4 mg, Q4H PRN    multi-electrolyte (Plasmalyte-A/Isolyte-S PH 7.4/Normosol-R) IV solution, Continuous, Last Rate: 125 mL/hr (07/20/25 0806)    ondansetron (ZOFRAN) injection 4 mg, Q6H PRN    oxyCODONE (ROXICODONE) IR tablet 5 mg, Q4H PRN **OR** oxyCODONE (ROXICODONE) immediate release tablet 10 mg, Q4H PRN    pantoprazole (PROTONIX) EC tablet 40 mg, Early Morning    Administrative Statements   Today, Patient Was Seen By: SHRUTHI Dobbins      **Please Note: This note may have been constructed using a voice recognition system.**

## 2025-07-20 NOTE — CASE MANAGEMENT
Case Management Assessment & Discharge Planning Note    Patient name Elliot Woods  Location /-01 MRN [id number]  : 1970 Date 2025       Current Admission Date: 2025  Current Admission Diagnosis:Pancreatic mass   Patient Active Problem List    Diagnosis Date Noted    Gastric nodule 2025    Pancreatic mass 2025    Annual physical exam 2022    Anxiety 2020    Class 3 severe obesity due to excess calories with serious comorbidity and body mass index (BMI) of 40.0 to 44.9 in adult 2020    Other male erectile dysfunction 2019    Hyperlipidemia 2019    Hypertension 2019    Type 2 diabetes mellitus (HCC) 2019      LOS (days): 2  Geometric Mean LOS (GMLOS) (days): 2.3  Days to GMLOS:-0.1     OBJECTIVE:    Risk of Unplanned Readmission Score: 9.18         Current admission status: Inpatient       Preferred Pharmacy:   PATIENT/FAMILY REPORTS NO PREFERRED PHARMACY  No address on file      [id number] - [place], [address] [address]  Phone: [phone] Fax: [phone]    Primary Care Provider: No primary care provider on file.    Primary Insurance:   Secondary Insurance:     ASSESSMENT:  Active Health Care Proxies    There are no active Health Care Proxies on file.       Patient Information  Admitted from:: Home  Mental Status: Alert  During Assessment patient was accompanied by: Not accompanied during assessment  Assessment information provided by:: Patient  Primary Caregiver: Self  Support Systems: Self, Spouse/significant other  What city do you live in?: [place]  Type of Current Residence: 2 story home  Living Arrangements: Lives w/ Spouse/significant other, Lives w/ Children  Is patient a ?: No    Activities of Daily Living Prior to Admission  Functional Status: Independent  Completes ADLs independently?: Yes  Ambulates independently?: Yes  Does patient use assisted  devices?: No  Does patient currently own DME?: No  Does patient have a history of Outpatient Therapy (PT/OT)?: No  Does the patient have a history of Short-Term Rehab?: No  Does patient have a history of HHC?: No  Does patient currently have HHC?: No    Patient Information Continued  Income Source: Unknown  Does patient have prescription coverage?: Yes  Can the patient afford their medications and any related supplies (such as glucometers or test strips)?: Yes  Does patient receive dialysis treatments?: No  Does patient have a history of substance abuse?: No  Does patient have a history of Mental Health Diagnosis?: No    Means of Transportation  Means of Transport to Appts:: Drives Self      DISCHARGE DETAILS:    Discharge planning discussed with:: Patient  Freedom of Choice: Yes  Comments - Freedom of Choice: Discussed FOC  CM contacted family/caregiver?: No- see comments (declined)       Other Referral/Resources/Interventions Provided:  Referral Comments: This CM introduced self and role to patient, lives with spouse in 2 story home, independent with ADLS, no DME at home.  No history of STR, HH or OP therapy noted. Patient states he doesn't have any medical coverage, email sent to hospital financial counselors, requesting referral to [facility], email confirmation received by this CM

## 2025-07-20 NOTE — ASSESSMENT & PLAN NOTE
Status post EUS with biopsy    Upon evaluation, patient was noted to be in minimal distress.  States that his pain has been much better since yesterday, but still has some soreness in his abdomen.  He is tolerating his current pain regimen well and able to tolerate p.o. diet significant nausea or vomiting.  Discussed with patient that he should try to manage his pain with oral pain medication and avoid IV pain medication so we will plan to stop his as needed morphine for breakthrough pain since he is tolerating diet.  His pain is most likely due to some inflammation from the biopsy as seen on imaging which is likely get better over the next few days.      Continue multimodal analgesia:  - P.o. Tylenol 975 mg every 6 hours  - Oxycodone 5/10 mg every 4 hours as needed for moderate/severe pain, can consider opioid rotating to p.o. Dilaudid if needed  - Stop IV morphine for breakthrough pain given that his pain is improving and would avoid IV opioids since patient does not have obvious source of acute pain, would also avoid IV morphine in patients with decreased renal clearance due to high risk of side effects from metabolite accumulation.  - Lidocaine patches every 12 hours  - Can consider gabapentin 100 mg 3 times daily  - Consider Robaxin p.o. 650 mg every 6 hours for muscle spasms  - As needed Narcan while patient on opioids

## 2025-07-20 NOTE — CONSULTS
Consultation - Oncology-Surgical   Name: Elliot Woods 55 y.o. male I MRN: [id number]  Unit/Bed#: -01 I Date of Admission: 7/17/2025   Date of Service: 7/20/2025 I Hospital Day: 2   Inpatient Consult to Surgical Oncology  Consult performed by: Reese Lamb MD  Consult ordered by: Delmy Valverde DO        Physician Requesting Evaluation: Wolf Riley MD   Reason for Evaluation / Principal Problem: pancreatic mass    Assessment & Plan  Pancreatic mass  Gastric nodule  Peripancreatic hematoma  1.4 cm well-circumscribed, hyperenhancing pancreatic nodule, likely neuroendocrine tumor.    9.0 x 7.6 x 7.2 cm nonenhancing mass abutting the pancreatic tail, consistent with hematoma, without active hemorrhage    Plan:  - F/u FNA of pancreatic mass  - F/u Hgb  - Can follow up outpatient with FNA results          History of Present Illness   Elliot Woods is a 55 y.o. male with hx of HTN, DM2, HLD who presents with 6 months of abd pain and nausea. He underwent EUS and biopsy of gastric fundus nodule showing spindle cells at [facility] in April. CT in May showed a 1.4 cm pancreatic mass. He was lost to follow up. Yesterday he presented for abd pain, CT showed 1.8cm lesion in pancreatic neck. He had an EUS with GI on 7/18 for biopsy of the pancreatic mass. MRI w/ MRCP on 7/20 showing 1.4cm pancreatic nodule and a new 9 x 7.6 x 7.2cm nonenhancing hematoma with no active hemorrhage. He is hemodynamically stable. Hbg is 12.3 today from 13.2. His pain is currently improving. He denies n/v, cp, sob, unintentional weight loss, or night sweats.    Review of Systems  Medical History Review: I have reviewed the patient's PMH, PSH, Social History, Family History, Meds, and Allergies   Historical Information   Past Medical History[1]  Past Surgical History[2]  Social History[3]  E-Cigarette/Vaping    E-Cigarette Use Never User      E-Cigarette/Vaping Substances    Nicotine No     THC No     CBD No     Flavoring No      Other No     Unknown No      Family History[4]  Social History[5]    Current Facility-Administered Medications:     acetaminophen (TYLENOL) tablet 975 mg, Q6H DEJUAN    amLODIPine (NORVASC) tablet 10 mg, Daily    [Held by provider] atorvastatin (LIPITOR) tablet 40 mg, Daily    bisacodyl (DULCOLAX) rectal suppository 10 mg, Daily PRN    docusate sodium (COLACE) capsule 100 mg, BID    hydrALAZINE (APRESOLINE) injection 5 mg, Q6H PRN    hydrOXYzine HCL (ATARAX) tablet 25 mg, Q6H PRN    insulin lispro (HumALOG/ADMELOG) 100 units/mL subcutaneous injection 1-5 Units, TID With Meals **AND** Fingerstick Glucose (POCT), 4x Daily AC and at bedtime    lidocaine (LIDODERM) 5 % patch 1 patch, Daily    metoclopramide (REGLAN) injection 5 mg, Q6H PRN    multi-electrolyte (Plasmalyte-A/Isolyte-S PH 7.4/Normosol-R) IV solution, Continuous, Last Rate: 125 mL/hr (07/20/25 1708)    ondansetron (ZOFRAN) injection 4 mg, Q6H PRN    oxyCODONE (ROXICODONE) IR tablet 5 mg, Q4H PRN **OR** oxyCODONE (ROXICODONE) immediate release tablet 10 mg, Q4H PRN    pantoprazole (PROTONIX) EC tablet 40 mg, Early Morning    polyethylene glycol (MIRALAX) packet 17 g, Daily    senna (SENOKOT) tablet 17.2 mg, Daily PRN    simethicone (MYLICON) chewable tablet 80 mg, Q6H PRN  Prior to Admission Medications   Prescriptions Last Dose Informant Patient Reported? Taking?   amLODIPine (NORVASC) 10 mg tablet   No No   Sig: Take 1 tablet (10 mg total) by mouth daily   atorvastatin (LIPITOR) 40 mg tablet   No No   Sig: Take 1 tablet (40 mg total) by mouth daily   hydrocortisone 0.5 % cream  Self Yes No   Sig: Hydrocortisone (Hydrocortisone 0.5 % Cream 28 Gms)  28.35 GM TUBE   metFORMIN (GLUCOPHAGE-XR) 500 mg 24 hr tablet   No No   Sig: TAKE 1 TABLET BY MOUTH DAILY X 1 WEEK, THEN INCREASE TO 2 TABS X 1 WEEK, THEN 3 TABS X 1 WEEK, THEN 4 TABS DAILY   metoclopramide (Reglan) 10 mg tablet   No No   Sig: Take 1 tablet (10 mg total) by mouth every 6 (six) hours as needed  (nausea) for up to 5 days   naproxen (Naprosyn) 500 mg tablet   No No   Sig: Take 1 tablet (500 mg total) by mouth 2 (two) times a day with meals for 7 days   olmesartan (BENICAR) 20 mg tablet   No No   Sig: Take 1 tablet (20 mg total) by mouth daily   pantoprazole (PROTONIX) 40 mg tablet  Self Yes No   Sig: TAKE 1 TABLET BY MOUTH BEFORE BREAKFAST      Facility-Administered Medications: None     Lisinopril    Objective :  Temp:  [97.9 °F (36.6 °C)-99.2 °F (37.3 °C)] 99.2 °F (37.3 °C)  HR:  [] 96  BP: (142-150)/(77-96) 142/84  Resp:  [17-18] 18  SpO2:  [91 %-94 %] 94 %  O2 Device: None (Room air)      Physical Exam  General: NAD  HENT: NCAT MMM  Neck: supple, no JVD  CV: nl rate  Lungs: nl wob. No resp distress  ABD: Soft, LUQ ttp, mild distention  Extrem: No CCE  Neuro: AAOx3      Lab Results: I have reviewed the following results:  Recent Labs     07/18/25  0437 07/19/25  0614 07/20/25  0444   WBC 11.98*   < > 17.26*   HGB 13.4   < > 12.2   HCT 41.2   < > 36.6      < > 301   SODIUM 138   < > 137   K 3.6   < > 3.8      < > 100   CO2 25   < > 27   BUN 13   < > 26*   CREATININE 0.86   < > 1.69*   GLUC 127   < > 168*   MG 1.8*  --   --    AST 15   < > 14   ALT 16   < > 14   ALB 4.1   < > 3.8   TBILI 0.63   < > 1.60*   ALKPHOS 90   < > 85   PTT 27  --   --    INR 1.06  --   --     < > = values in this interval not displayed.       MRI OF THE ABDOMEN WITH AND WITHOUT CONTRAST WITH MRCP 7/20     INDICATION: 55 years  / Male. pancreatic mass. Gastroenterology note from 7/19/2025 was reviewed. Patient presented with nausea and vomiting. CT demonstrated a 1.8 cm hyperenhancing lesion in the pancreatic neck. EUS was performed which did not   demonstrate definite corresponding lesion, although a subtle hypoechoic area in the neck was visualized and FNA performed.     Patient also has history of gastric fundal spindle cell tumor.     COMPARISON: Abdomen pelvic CT from 11/20/2021, chest, abdomen, pelvic CT  from 2/18/2022, abdomen pelvic CT from 7/17/2025, and EUS from 7/18/2025.     TECHNIQUE: Multiplanar/multisequence MRI of the abdomen with 3D MRCP was performed before and after administration of contrast.     IV Contrast: 9 mL of Gadobutrol injection (SINGLE-DOSE)     FINDINGS:     LOWER CHEST: Unremarkable.     LIVER:  Normal in size and configuration.  No suspicious mass.  Patent hepatic and portal veins.     BILE DUCTS: No intrahepatic or extrahepatic bile duct dilation. Common bile duct is normal in caliber. No choledocholithiasis, biliary stricture or suspicious mass.     GALLBLADDER: Normal.     PANCREAS: Corresponding to CT findings, there is a 1.4 cm well-circumscribed, hyperenhancing pancreatic nodule, likely neuroendocrine tumor (series 13 images 338-337.)     There is a new 9.0 x 7.6 x 7.2 cm nonenhancing mass abutting the pancreatic tail, consistent with hematoma, without active hemorrhage (series 6 images 22-36.)     There is surrounding fat stranding.     ADRENAL GLANDS: Unremarkable.     SPLEEN: Normal.     KIDNEYS/PROXIMAL URETERS: No hydroureteronephrosis. No suspicious renal mass. Several tiny simple renal cysts.     BOWEL: No dilated loops of bowel. No evidence of gastric mass.     PERITONEUM/RETROPERITONEUM: Small amount of ascites, new compared to the 7/17/2025 CT.     LYMPH NODES: No abdominal lymphadenopathy.     VESSELS: No aneurysm.     ABDOMINAL WALL: Unremarkable.     BONES: No suspicious osseous lesion.     IMPRESSION:     Corresponding to CT findings, there is a 1.4 cm well-circumscribed, hyperenhancing pancreatic nodule, likely neuroendocrine tumor (series 13 images 338-337.)     There is a new 9.0 x 7.6 x 7.2 cm nonenhancing mass abutting the pancreatic tail, consistent with hematoma, without active hemorrhage (series 6 images 22-36.)     There is also fat stranding surrounding the hematoma and pancreatic tail. Please correlate with laboratory findings for evidence of acute  pancreatitis.     Small amount of ascites, new compared to the 7/17/2025 CT.        CT ABDOMEN AND PELVIS WITH IV CONTRAST 7/17     INDICATION: abdominal pain, nausea, vomiting. .     COMPARISON: Multiple priors most recently 2/18/2022     TECHNIQUE: CT examination of the abdomen and pelvis was performed. Multiplanar 2D reformatted images were created from the source data.     This examination, like all CT scans performed in the [facility], was performed utilizing techniques to minimize radiation dose exposure, including the use of iterative reconstruction and automated exposure control. Radiation dose length   product (DLP) for this visit: 486 mGy-cm.     IV Contrast: 75 mL of iohexol (OMNIPAQUE)  Enteric Contrast: Not administered.     FINDINGS:     ABDOMEN     LOWER CHEST: Small hiatal hernia. No other clinically significant abnormality in the visualized lower chest.     LIVER/BILIARY TREE: Unremarkable.     GALLBLADDER: No calcified gallstones. No pericholecystic inflammatory change.     SPLEEN: Unremarkable.     PANCREAS: 1.8 cm hyperenhancing lesion in the pancreatic neck (302, 59)     ADRENAL GLANDS: Unremarkable.     KIDNEYS/URETERS: No hydronephrosis or urinary tract calculi. Subcentimeter hypoattenuating renal lesion(s), too small to characterize but statistically likely benign, which do not warrant follow-up (Radiology June 2019).     STOMACH AND BOWEL: Colonic diverticulosis without findings of acute diverticulitis.     APPENDIX: Normal.     ABDOMINOPELVIC CAVITY: No ascites. No pneumoperitoneum. No lymphadenopathy.     VESSELS: Atherosclerosis without abdominal aortic aneurysm.     PELVIS     REPRODUCTIVE ORGANS: Unremarkable for patient's age.     URINARY BLADDER: Unremarkable.     ABDOMINAL WALL/INGUINAL REGIONS: Unremarkable.     BONES: No acute fracture or suspicious osseous lesion. Spinal degenerative changes.     IMPRESSION:     No acute findings in the abdomen or pelvis.     1.8  cm hyperenhancing lesion in the pancreatic neck, raising concern for neoplasm. Recommend MRI of the abdomen with and without contrast for complete evaluation.    VTE Pharmacologic Prophylaxis: VTE covered by:    None      VTE Mechanical Prophylaxis: sequential compression device    Reese Lamb MD  General Surgery, PGY-1         [1]   Past Medical History:  Diagnosis Date    Diabetes mellitus (HCC)     Hypertension    [2] No past surgical history on file.  [3]   Social History  Tobacco Use    Smoking status: Never    Smokeless tobacco: Never   Vaping Use    Vaping status: Never Used   Substance and Sexual Activity    Alcohol use: Not Currently    Drug use: Yes     Types: Marijuana   [4] No family history on file.  [5]   Social History  Tobacco Use    Smoking status: Never    Smokeless tobacco: Never   Vaping Use    Vaping status: Never Used   Substance and Sexual Activity    Alcohol use: Not Currently    Drug use: Yes     Types: Marijuana

## 2025-07-20 NOTE — CONSULTS
Consultation - Acute Pain   Name: Elliot Woods 55 y.o. male I MRN: [id number]  Unit/Bed#: -01 I Date of Admission: 7/17/2025   Date of Service: 7/20/2025 I Hospital Day: 2   Inpatient consult to Acute Pain Service  Consult performed by: Ricardo Murcia MD  Consult ordered by: SHRUTHI Crane        Physician Requesting Evaluation: Wolf Riley MD   Reason for Evaluation / Principal Problem: Acute abdominal pain    Assessment & Plan  Pancreatic mass  Status post EUS with biopsy    Upon evaluation, patient was noted to be in minimal distress.  States that his pain has been much better since yesterday, but still has some soreness in his abdomen.  He is tolerating his current pain regimen well and able to tolerate p.o. diet significant nausea or vomiting.  Discussed with patient that he should try to manage his pain with oral pain medication and avoid IV pain medication so we will plan to stop his as needed morphine for breakthrough pain since he is tolerating diet.  His pain is most likely due to some inflammation from the biopsy as seen on imaging which is likely get better over the next few days.      Continue multimodal analgesia:  - P.o. Tylenol 975 mg every 6 hours  - Oxycodone 5/10 mg every 4 hours as needed for moderate/severe pain, can consider opioid rotating to p.o. Dilaudid if needed  - Stop IV morphine for breakthrough pain given that his pain is improving and would avoid IV opioids since patient does not have obvious source of acute pain, would also avoid IV morphine in patients with decreased renal clearance due to high risk of side effects from metabolite accumulation.  - Lidocaine patches every 12 hours  - Can consider gabapentin 100 mg 3 times daily  - Consider Robaxin p.o. 650 mg every 6 hours for muscle spasms  - As needed Narcan while patient on opioids  Hyperlipidemia    Hypertension    Class 3 severe obesity due to excess calories with serious comorbidity and body mass  index (BMI) of 40.0 to 44.9 in adult    Type 2 diabetes mellitus (HCC)  Lab Results   Component Value Date    HGBA1C 7.5 (H) 07/18/2025       Recent Labs     07/19/25  2057 07/20/25  0550 07/20/25  1112 07/20/25  1650   POCGLU 251* 175* 174* 137       Blood Sugar Average: Last 72 hrs:  (P) [id number]    Gastric nodule    COLLIN (acute kidney injury) (HCC)    Serum total bilirubin elevated    Leukocytosis          APS will sign off at this time. Thank you for the consult. All opioids and other analgesics to be written at discretion of primary team. Please contact Acute Pain Service - via SecureChat from [phone] with additional questions or concerns. See [employer] or [employer] for additional contacts and after hours information.    History of Present Illness    HPI: Elliot Woods is a 55 y.o. year old male who presents with with history of diabetes, hypertension, hyperlipidemia presenting with abdominal pain with noted pancreatic lesion on imaging status post EUS with biopsy on 7/18/2025 complicated by increasing abdominal pain postprocedure.  APS team consulted for pain management.    Current pain location(s): Pain Score: 7  Pain Location/Orientation: Location: Abdomen  Pain Scale: Pain Assessment Tool: 0-10  Current Analgesic regimen: P.o. Tylenol, as needed oxy, IV morphine as needed, lidocaine patches    Pain History: None  Pain Management Physician: Not applicable  I have reviewed the patient's controlled substance dispensing history in the Prescription Drug Monitoring Program in compliance with the [facility] regulations before prescribing any controlled substances.     Review of Systems  Medical History Review: I have reviewed the patient's PMH, PSH, Social History, Family History, Meds, and Allergies     Objective :  Temp:  [97.9 °F (36.6 °C)-99.2 °F (37.3 °C)] 99.2 °F (37.3 °C)  HR:  [] 96  BP: (142-150)/(77-96) 142/84  Resp:  [17-18] 18  SpO2:  [91 %-94 %] 94 %  O2 Device: None (Room air)    Physical  Exam     Lab Results: I have reviewed the following results:  Estimated Creatinine Clearance: 55.4 mL/min (A) (by C-G formula based on SCr of 1.69 mg/dL (H)).  Lab Results   Component Value Date    WBC 17.26 (H) 07/20/2025    HGB 12.2 07/20/2025    HCT 36.6 07/20/2025     07/20/2025         Component Value Date/Time    K 3.8 07/20/2025 0444    K 4.3 07/05/2022 1120     07/20/2025 0444     07/05/2022 1120    CO2 27 07/20/2025 0444    CO2 30 07/05/2022 1120    BUN 26 (H) 07/20/2025 0444    BUN 13 07/05/2022 1120    CREATININE 1.69 (H) 07/20/2025 0444         Component Value Date/Time    CALCIUM 9.7 07/20/2025 0444    CALCIUM 10.0 07/05/2022 1120    ALKPHOS 85 07/20/2025 0444    AST 14 07/20/2025 0444    ALT 14 07/20/2025 0444    TP 6.9 07/20/2025 0444    ALB 3.8 07/20/2025 0444

## 2025-07-20 NOTE — ASSESSMENT & PLAN NOTE
Patient is a 55-year-old male with a PMHx of  hypertension, hyperlipidemia, diabetes, and concern for spindle cell neoplasm (see below) who presented with nausea/vomiting and accompanying abdominal pain. CT imaging obtained this admission revealed a 1.8 cm hyperenhancing lesion in the pancreatic neck concern for neoplasm.  Notably, patient previously had a CT scan on May 31 in [facility] which also showed a 1.5 cm pancreatic lesion.  EUS recommended at that time but not completed. Family history is notable for gastric cancer in the patient's aunt and liver cancer in the patient's mother.  Due to ongoing symptoms and increased in mass size, EUS performed inpatient for further evaluation.      Patient status post EUS on 7/18/2025 with advanced endoscopy.  The hyperenhancing lesions seen at the neck of pancreas on initial imaging was not clearly visualized.  There was a subtle hypoechoic area at the neck of the pancreas measuring 1.2 to 1.8 cm depending on the angle.  This could potentially represent lesion on the CT.  FNB was performed and biopsy results pending at this time.  Given inconclusive US findings, MRI imaging recommended to better delineate CT findings.  Given severe abdominal pain postprocedure, patient remained inpatient and fortunately MRI was able to be completed overnight.  MRI revealed a 1.4 cm well-circumscribed, hyperenhancing pancreatic nodule likely representing a neuroendocrine tumor.  This finding likely corresponds to the initial CT imaging which will require repeat EUS to further assess if biopsies inconclusive.  However in addition to this, patient also with evidence of a 9.0 x 7.6 x 7.2 cm nonenhancing mass abutting the pancreatic tail, consistent with a hematoma, but no evidence of active hemorrhage.  Patient also with mild fat stranding around the hematoma and pancreatic tail.      Hematoma and inflammation likely the cause of patient's severe abdominal discomfort postprocedure  and yesterday.  Fortunately, abdominal pain and overall symptoms now improving.  Labs demonstrating stable hemoglobin but uptrending leukocytosis to 17.26.  Suspect that this is likely reactive as patient otherwise has been afebrile.  Recommend going symptomatic monitoring off antibiotics.  Patient will ultimately require repeat CT imaging in 2 to 4 weeks to reassess hematoma and determine timing of repeat EUS if indicated based upon the biopsy results.    Plan;  No further inpatient procedures warranted or planned  Okay to advance to low fiber/low residue diet  Follow-up results of fine-needle biopsy to determine if repeat EUS is indicated; will also consult surgical oncology  Monitor H&H to ensure ongoing stabilization  Monitor off antibiotics, monitor WBC and trend fever curve  Serial abdominal examinations, low threshold for repeat CT imaging if symptoms worsen  Recommend that repeat CT imaging be performed outpatient in 2 weeks   Additional pain and symptom management per primary team  Will require outpatient GI follow-up, message schedulers to arrange follow-up

## 2025-07-20 NOTE — PROGRESS NOTES
Progress Note - Gastroenterology   Name: Elliot Woods 55 y.o. male I MRN: [id number]  Unit/Bed#: -01 I Date of Admission: 7/17/2025   Date of Service: 7/20/2025 I Hospital Day: 2    Assessment & Plan  Pancreatic mass  Patient is a 55-year-old male with a PMHx of  hypertension, hyperlipidemia, diabetes, and concern for spindle cell neoplasm (see below) who presented with nausea/vomiting and accompanying abdominal pain. CT imaging obtained this admission revealed a 1.8 cm hyperenhancing lesion in the pancreatic neck concern for neoplasm.  Notably, patient previously had a CT scan on May 31 in [facility] which also showed a 1.5 cm pancreatic lesion.  EUS recommended at that time but not completed. Family history is notable for gastric cancer in the patient's aunt and liver cancer in the patient's mother.  Due to ongoing symptoms and increased in mass size, EUS performed inpatient for further evaluation.      Patient status post EUS on 7/18/2025 with advanced endoscopy.  The hyperenhancing lesions seen at the neck of pancreas on initial imaging was not clearly visualized.  There was a subtle hypoechoic area at the neck of the pancreas measuring 1.2 to 1.8 cm depending on the angle.  This could potentially represent lesion on the CT.  FNB was performed and biopsy results pending at this time.  Given inconclusive US findings, MRI imaging recommended to better delineate CT findings.  Given severe abdominal pain postprocedure, patient remained inpatient and fortunately MRI was able to be completed overnight.  MRI revealed a 1.4 cm well-circumscribed, hyperenhancing pancreatic nodule likely representing a neuroendocrine tumor.  This finding likely corresponds to the initial CT imaging which will require repeat EUS to further assess if biopsies inconclusive.  However in addition to this, patient also with evidence of a 9.0 x 7.6 x 7.2 cm nonenhancing mass abutting the pancreatic tail, consistent with a  hematoma, but no evidence of active hemorrhage.  Patient also with mild fat stranding around the hematoma and pancreatic tail.      Hematoma and inflammation likely the cause of patient's severe abdominal discomfort postprocedure and yesterday.  Fortunately, abdominal pain and overall symptoms now improving.  Labs demonstrating stable hemoglobin but uptrending leukocytosis to 17.26.  Suspect that this is likely reactive as patient otherwise has been afebrile.  Recommend going symptomatic monitoring off antibiotics.  Patient will ultimately require repeat CT imaging in 2 to 4 weeks to reassess hematoma and determine timing of repeat EUS if indicated based upon the biopsy results.    Plan;  No further inpatient procedures warranted or planned  Okay to advance to low fiber/low residue diet  Follow-up results of fine-needle biopsy to determine if repeat EUS is indicated; will also consult surgical oncology  Monitor H&H to ensure ongoing stabilization  Monitor off antibiotics, monitor WBC and trend fever curve  Serial abdominal examinations, low threshold for repeat CT imaging if symptoms worsen  Recommend that repeat CT imaging be performed outpatient in 2 weeks   Additional pain and symptom management per primary team  Will require outpatient GI follow-up, message schedulers to arrange follow-up  Gastric nodule  Patient previously evaluated by gastroenterology at [facility].  He underwent an endoscopic ultrasound on 4/23/2025 which revealed a submucosal nodule in the gastric fundus.  Gastric biopsies revealed severely chronic gastritis with no evidence of intestinal metaplasia or H. pylori.  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  Patient does have history of gastric cancer.  Endoscopic ultrasound performed this admission with normal-appearing stomach.    Please contact the Secure[employer] role for the Gastroenterology service with  any questions/concerns.    Subjective   Patient seen and examined at bedside.  Sitting in bed comfortably in no acute distress or visible discomfort.  Admits to significant improvement in abdominal pain all though still mildly present.  Denies any fever/chills.  No nausea/vomiting and tolerating clear liquid diet.  Offers no additional complaints.    Objective :  Temp:  [97.9 °F (36.6 °C)-99.1 °F (37.3 °C)] 97.9 °F (36.6 °C)  HR:  [] 96  BP: (140-150)/(77-96) 150/77  Resp:  [17-18] 18  SpO2:  [91 %-94 %] 94 %  O2 Device: None (Room air)    Physical Exam  Constitutional:       General: He is not in acute distress.     Appearance: He is obese. He is not ill-appearing or toxic-appearing.   HENT:      Head: Normocephalic and atraumatic.      Nose: Nose normal.     Eyes:      General: No scleral icterus.      Cardiovascular:      Rate and Rhythm: Normal rate.      Pulses: Normal pulses.   Pulmonary:      Effort: Pulmonary effort is normal.   Abdominal:      General: Abdomen is flat. There is no distension.      Palpations: Abdomen is soft.      Tenderness: There is abdominal tenderness (Tender to palpation of the epigastric and left upper quadrant region).      Hernia: No hernia is present.     Musculoskeletal:      Cervical back: Normal range of motion.     Skin:     General: Skin is warm.      Coloration: Skin is not pale.     Neurological:      Mental Status: He is oriented to person, place, and time.     Psychiatric:         Mood and Affect: Mood normal.         Behavior: Behavior normal.           Lab Results: I have reviewed the following results:CBC/BMP:   .     07/20/25  0444   WBC 17.26*   HGB 12.2   HCT 36.6      SODIUM 137   K 3.8      CO2 27   BUN 26*   CREATININE 1.69*   GLUC 168*    , Creatinine Clearance: Estimated Creatinine Clearance: 55.4 mL/min (A) (by C-G formula based on SCr of 1.69 mg/dL (H))., LFTs:   .     07/20/25  0444   AST 14   ALT 14   ALB 3.8   TBILI 1.60*   ALKPHOS 85     , PTT/INR:No new results in last 24 hours. , Troponin,BNP:No new results in last 24 hours.     Imaging Results Review: I reviewed radiology reports from this admission including: MRI abdomen/MRCP.  Other Study Results Review: No additional pertinent studies reviewed.

## 2025-07-20 NOTE — ASSESSMENT & PLAN NOTE
Lab Results   Component Value Date    HGBA1C 7.5 (H) 07/18/2025       Recent Labs     07/19/25 2057 07/20/25  0550 07/20/25  1112 07/20/25  1650   POCGLU 251* 175* 174* 137       Blood Sugar Average: Last 72 hrs:  (P) [id number]

## 2025-07-20 NOTE — ASSESSMENT & PLAN NOTE
1.4 cm well-circumscribed, hyperenhancing pancreatic nodule, likely neuroendocrine tumor.    9.0 x 7.6 x 7.2 cm nonenhancing mass abutting the pancreatic tail, consistent with hematoma, without active hemorrhage    Plan:  - F/u FNA of pancreatic mass  - F/u Hgb  - Can follow up outpatient with FNA results

## 2025-07-20 NOTE — ASSESSMENT & PLAN NOTE
MRI of the abdomen with and without contrast 7/20/25- Corresponding to CT findings, there is a 1.4 cm well-circumscribed, hyperenhancing pancreatic nodule, likely neuroendocrine tumor. There is a new 9.0 x 7.6 x 7.2 cm nonenhancing mass abutting the pancreatic tail, consistent with hematoma, without active hemorrhage. There is also fat stranding surrounding the hematoma and pancreatic tail. Please correlate with laboratory findings for evidence of acute pancreatitis.  Monitor t bili level   Hold statin

## 2025-07-20 NOTE — ASSESSMENT & PLAN NOTE
Lab Results   Component Value Date    HGBA1C 7.5 (H) 07/18/2025       Recent Labs     07/19/25  1100 07/19/25  1534 07/19/25 2057 07/20/25  0550   POCGLU 220* 175* 251* 175*       Blood Sugar Average: Last 72 hrs:  (P) [id number]    Hold metformin   C/w SSI coverage   Hypoglycemic protocol

## 2025-07-20 NOTE — ASSESSMENT & PLAN NOTE
Lab Results   Component Value Date    CREATININE 1.69 (H) 07/20/2025    CREATININE 1.06 07/19/2025    CREATININE 0.86 07/18/2025   In the setting of dye exposure, poor PO intake/prerenal d/t abd pain/n/v and ARB use   Start IVFs today- isolyte 125ml/hr  Retention protocol- PVR this am was (-)   Hold ARB   Check am bmp   Avoid hypotension or nephrotoxic drugs

## 2025-07-20 NOTE — ASSESSMENT & PLAN NOTE
Presented with abd pain and vomiting, lipase negative. Patient moved to the area recently but previously followed at [facility].  Noted to have abd pain, N/V for several months. 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  It does not appear during that EUS that any notes were made regarding any biopsies of any pancreatic masses and unclear if mass was known then. Subsequent CT A/P on 5/31/25 at [facility] which showed a 1.5 cm pancreatic head enhancing nodule suspicious for a neuroendocrine tumor at that time for which EUS and tissue sampling was recommended but wasn't yet performed  CT abdomen pelvis with contrast with a 1.8 cm hyperenhancing lesion in the pancreatic neck, raising concern for neoplasm.    GI consulted   S/p EUS 7/18 unfortunately unable to identify mass but noted subtle hypoechoic area at the neck of the pancreas which was biopsied   MRI of the abdomen with and without contrast 7/20/25- Corresponding to CT findings, there is a 1.4 cm well-circumscribed, hyperenhancing pancreatic nodule, likely neuroendocrine tumor. There is a new 9.0 x 7.6 x 7.2 cm nonenhancing mass abutting the pancreatic tail, consistent with hematoma, without active hemorrhage. There is also fat stranding surrounding the hematoma and pancreatic tail. Please correlate with laboratory findings for evidence of acute pancreatitis.  Per GI continue to monitor given hematoma- hgb stable, abdominal pain improving. no evidence of active bleeding  leukocytosis likely reactive, will observe off of antibiotics at this time.   Cleared to start upgrading diet- was tolerating a clear liquid diet

## 2025-07-20 NOTE — UTILIZATION REVIEW
Initial Clinical Review    Admission: Date/Time/Statement:   Admission Orders (From admission, onward)       Ordered        07/18/25 0003  INPATIENT ADMISSION  Once                          Orders Placed This Encounter   Procedures    INPATIENT ADMISSION     Standing Status:   Standing     Number of Occurrences:   1     Level of Care:   Med Surg [16]     Estimated length of stay:   More than 2 Midnights     Certification:   I certify that inpatient services are medically necessary for this patient for a duration of greater than two midnights. See H&P and MD Progress Notes for additional information about the patient's course of treatment.     ED Arrival Information       Expected   -    Arrival   7/17/2025 19:21    Acuity   Urgent              Means of arrival   Walk-In    Escorted by   Self    Service   Hospitalist    Admission type   Emergency              Arrival complaint   Nausea or Vomiting             Chief Complaint   Patient presents with    Vomiting     Pt seen and treated earlier for vomiting and was told to come back if things get worse. Denies vomiting but c/o nausea and sweating. States meds he was prescribed aren't working      PMH  HLD, HTN, DM .   Moved to [place] (from [place])  approx one week ago.    Previous gi workup in [place] - [facility] revealed  sub-2 cm pancreatic lesion     Initial Presentation: 55 y.o. male   to ER from home.   Seen in ER earlier today for the same symptoms/ treated w/relief.  Pt then picked up his prescribed antiemetic from the pharmacy but could not stop vomiting so he came came back to the ED.   2nd ER visit for  ongoing abd pain, N/V, diaphoresis. markedly diaphoretic, and mildly hypertensive on arrival. (+) Significant epigastric and LUQ tenderness on exam.  Admit  INPT status to Internal Med service,  MS   Level of care  for urgent endoscopic ultrasound with a fine-needle aspiration today.  Keep npo,   GI Consult.  Repeat basic labs,  CTAP ,  MRI/ MRCP ordered.   IV fluids as well  as pain control and antiemetics.   [employer] Coverage for dm management.      7/18 @  0004     Anticipated Length of Stay/Certification Statement:  Patient will be admitted on an inpatient basis with an anticipated length of stay of greater than 2 midnights secondary to pancreatic mass.     EUS 7/18 unfortunately unable to identify mass but noted subtle hypoechoic area at the neck of the pancreas which was biopsied     Date: 7/19   Day 2:    per GI:     Pain/N/V  after procedure - controlled w/IV Meds/ IVF.    We will continue to advance diet as tolerated with supportive care.  MRI/MRCP to be done 7/20  T bili elevated today- hold PTA statin     7/20    minimal po intake/ clears.   T bili elevated today- hold PTA statin .  COLLIN today, will hold and monitor off of ARB,  start IVF - Isolyte @  125 cc/hr.   Trend Post void residuals   (-) this am.  Hold ARB.  Daily  bmp .  Avoid hypotension/ nephrotoxins     MRI of the abdomen with and without contrast 7/20/25- 1.4 cm well-circumscribed, hyperenhancing pancreatic nodule, likely neuroendocrine tumor. There is a new 9.0 x 7.6 x 7.2 cm nonenhancing mass abutting the pancreatic tail, consistent with hematoma, without active hemorrhage. There is also fat stranding surrounding the hematoma and pancreatic tail.     ====================================================================================================================      ED Treatment-Medication Administration from 07/17/2025 1921 to 07/18/2025 0846         Date/Time Order Dose Route Action     07/17/2025 2104 ketorolac (TORADOL) injection 15 mg 15 mg Intravenous Given     07/17/2025 2109 sodium chloride 0.9 % bolus 1,000 mL 1,000 mL Intravenous New Bag     07/17/2025 2102 droperidol (INAPSINE) injection 0.625 mg 0.625 mg Intravenous Given     07/17/2025 2104 ondansetron (ZOFRAN-ODT) dispersible tablet 4 mg 4 mg Oral Given     07/18/2025 0528 pantoprazole (PROTONIX) EC tablet 40 mg 40 mg Oral Given     07/18/2025 0036  insulin lispro (HumALOG/ADMELOG) 100 units/mL subcutaneous injection 1-5 Units 1 Units Subcutaneous Given     07/18/2025 0035 HYDROmorphone (DILAUDID) injection 0.5 mg 0.5 mg Intravenous Given     07/18/2025 0036 multi-electrolyte (Plasmalyte-A/Isolyte-S PH 7.4/Normosol-R) IV solution 100 mL/hr Intravenous New Bag            Scheduled Medications:  acetaminophen, 975 mg, Oral, Q6H DEJUAN  amLODIPine, 10 mg, Oral, Daily  [Held by provider] atorvastatin, 40 mg, Oral, Daily  docusate sodium, 100 mg, Oral, BID  insulin lispro, 1-5 Units, Subcutaneous, TID With Meals  lidocaine, 1 patch, Topical, Daily  pantoprazole, 40 mg, Oral, Early Morning  polyethylene glycol, 17 g, Oral, Daily      7/20   Continuous IV Infusions:  multi-electrolyte, 125 mL/hr, Intravenous, Continuous      PRN Meds:  bisacodyl, 10 mg, Rectal, Daily PRN  hydrALAZINE, 5 mg, Intravenous, Q6H PRN  hydrOXYzine HCL, 25 mg, Oral, Q6H PRN  metoclopramide, 5 mg, Intravenous, Q6H PRN  morphine injection, 4 mg, Intravenous, Q4H PRN   ... 7/19 x3,  7/20  @ 0600  ondansetron, 4 mg, Intravenous, Q6H PRN  oxyCODONE, 5 mg, Oral, Q4H PRN  oxyCODONE, 10 mg, Oral, Q4H PRN  ...  7/19 x5,  7/20 @ 0005 and 0800  senna, 2 tablet, Oral, Daily PRN  simethicone, 80 mg, Oral, Q6H PRN      ED Triage Vitals [07/17/25 1928]   Temperature Pulse Respirations Blood Pressure SpO2 Pain Score   99.4 °F (37.4 °C) 81 20 (!) 188/87 99 % 9     Weight (last 2 days)       Date/Time Weight    07/18/25 0900 105 (232.14)            Vital Signs (last 3 days)       Date/Time Temp Pulse Resp BP MAP (mmHg) SpO2 O2 Flow Rate (L/min) O2 Device Patient Position - Orthostatic VS Pain    07/20/25 0937 -- -- -- -- -- -- -- -- -- 7 07/20/25 0805 -- -- -- -- -- -- -- -- -- 9 07/20/25 07:38:43 97.9 °F (36.6 °C) 96 18 150/77 101 94 % -- -- -- --    07/20/25 0603 -- -- -- -- -- -- -- -- -- 8 07/20/25 0005 -- -- -- -- -- -- -- -- -- 9 07/19/25 2239 -- -- -- -- -- -- -- None (Room air) -- --     07/19/25 21:15:54 99.1 °F (37.3 °C) 100 17 142/96 111 91 % -- None (Room air) Sitting --    07/19/25 2112 -- -- -- -- -- -- -- -- -- 9    07/19/25 20:58:14 99.1 °F (37.3 °C) 116 17 142/96 111 92 % -- -- -- --    07/19/25 1700 -- -- -- -- -- -- -- -- -- 8    07/19/25 1549 -- -- -- -- -- -- -- -- -- 10 - Worst Possible Pain    07/19/25 15:33:54 98.2 °F (36.8 °C) 88 17 140/86 104 91 % -- None (Room air) Sitting --    07/19/25 1229 -- -- -- -- -- -- -- -- -- 9 07/19/25 1122 -- -- -- -- -- -- -- -- -- 9    07/19/25 0824 -- -- -- -- -- -- -- -- -- 10 - Worst Possible Pain    07/19/25 07:13:37 98.6 °F (37 °C) 89 16 130/89 103 96 % -- None (Room air) Sitting --    07/19/25 0545 -- -- -- -- -- -- -- -- -- 10 - Worst Possible Pain    07/18/25 23:52:14 98.5 °F (36.9 °C) 62 18 155/72 100 96 % -- -- -- --    07/18/25 2311 -- -- -- -- -- -- -- -- -- 10 - Worst Possible Pain    07/18/25 2238 -- -- -- -- -- -- -- -- -- 10 - Worst Possible Pain    07/18/25 20:49:51 98.1 °F (36.7 °C) -- -- 160/104 123 -- -- -- -- --    07/18/25 20:49:29 98.1 °F (36.7 °C) 78 -- 160/104 123 96 % -- -- -- --    07/18/25 2006 -- -- -- -- -- -- -- -- -- 10 - Worst Possible Pain    07/18/25 19:55:14 97.6 °F (36.4 °C) 96 -- 182/102 129 -- -- -- -- --    07/18/25 19:54:58 97.6 °F (36.4 °C) 96 -- 182/102 129 97 % -- -- -- --    07/18/25 1918 98.3 °F (36.8 °C) 98 16 178/107 131 -- -- None (Room air) Sitting 9    07/18/25 19:15:58 98.3 °F (36.8 °C) 91 -- 178/107 131 97 % -- -- -- --    07/18/25 1900 -- 87 -- 169/81 110 -- -- -- -- --    07/18/25 18:02:45 98.8 °F (37.1 °C) 103 -- 172/109 130 93 % -- -- -- --    07/18/25 18:02:28 98.8 °F (37.1 °C) 87 17 172/109 130 93 % -- -- -- --    07/18/25 1700 -- 74 -- 174/92 119 94 % -- -- -- --    07/18/25 1650 -- -- -- -- -- -- -- -- -- 7    07/18/25 15:56:52 -- 87 17 176/98 124 97 % -- -- -- --    07/18/25 15:53:19 98.7 °F (37.1 °C) 87 17 176/98 124 97 % -- -- Lying --    07/18/25 1531 -- 91 20 172/89 -- 98 % -- -- --  --    07/18/25 1530 -- -- -- -- -- -- -- -- -- 4    07/18/25 1527 -- -- -- 184/99 -- -- -- -- -- --    07/18/25 1520 -- 98 20 194/108 -- -- -- -- -- --    07/18/25 1512 96.5 °F (35.8 °C) 98 20 164/94 -- 97 % 6 L/min Simple mask -- --    07/18/25 1419 98 °F (36.7 °C) 72 -- 151/78 -- 94 % -- None (Room air) -- --    07/18/25 1100 -- -- -- -- -- -- -- -- -- No Pain    07/18/25 0900 98.3 °F (36.8 °C) 82 16 147/88 108 97 % -- None (Room air) Lying --    07/18/25 08:51:19 98.3 °F (36.8 °C) 65 16 147/88 108 96 % -- -- -- --    07/18/25 08:51:06 98.3 °F (36.8 °C) 65 -- 147/88 108 97 % -- -- -- --    07/18/25 0630 -- 70 18 -- -- 96 % -- None (Room air) -- --    07/18/25 0545 -- 68 20 122/58 84 94 % -- None (Room air) Sitting No Pain    07/18/25 0332 -- -- -- -- -- -- -- -- -- No Pain    07/18/25 0330 -- 78 18 152/77 105 95 % -- None (Room air) Sitting --    07/18/25 0300 -- 66 20 -- -- 94 % -- None (Room air) -- --    07/18/25 0035 -- -- -- -- -- -- -- -- -- 8    07/17/25 2333 -- 74 18 176/79 113 96 % -- None (Room air) Sitting 7    07/17/25 2104 -- -- -- -- -- -- -- -- -- 8    07/17/25 1928 99.4 °F (37.4 °C) 81 20 188/87 -- 99 % -- None (Room air) Sitting 9              Pertinent Labs/Diagnostic Test Results:   Radiology:  MRI abdomen w wo contrast and mrcp   Final Interpretation by Antonio Abdi MD (07/20 0603)      Corresponding to CT findings, there is a 1.4 cm well-circumscribed, hyperenhancing pancreatic nodule, likely neuroendocrine tumor (series 13 images 338-337.)      There is a new 9.0 x 7.6 x 7.2 cm nonenhancing mass abutting the pancreatic tail, consistent with hematoma, without active hemorrhage (series 6 images 22-36.)      There is also fat stranding surrounding the hematoma and pancreatic tail. Please correlate with laboratory findings for evidence of acute pancreatitis.      Small amount of ascites, new compared to the 7/17/2025 CT.      Workstation performed: [employer]         CT abdomen pelvis with  contrast   Final Interpretation by Omar Brown DO (07/17 2238)      No acute findings in the abdomen or pelvis.      1.8 cm hyperenhancing lesion in the pancreatic neck, raising concern for neoplasm. Recommend MRI of the abdomen with and without contrast for complete evaluation.      The study was marked in EPIC for immediate notification.         Workstation performed: [id number]         MRI abdomen w wo contrast and mrcp    (Results Pending)     Cardiology:  No orders to display     GI:  Endoscopic ultrasonography, GI (Upper)   Final Result by Daisy De La Rosa MD (07/18 1559)   The esophagus, stomach and duodenum appeared normal.   Normal celiac takeoff.  No evidence of pancreatic ductal dilation    throughout the examination.  Bile duct was normal caliber with no filling    defects noted.  Ampulla appeared normal.  No lymphadenopathy appreciated.     The pancreatic parenchyma appearred lobular with a reticular pattern most    notable at the neck/body of the pancreas.   The hyperenhancing lesion at the neck of the pancreas, seen on CT adjacent    to the aorta/SMA was not clearly visualized on EUS.  There was a subtle    hypoechoic area at the neck of the pancreas, that measured 1.2-1.8 cm at    different angles that could represent the lesion on CT, so this was    biopsied using 25 gauge [employer] needle x 3 passes.         RECOMMENDATION:   Follow up biopsy results   Follow up MRI   Obtain IgG levels                              Daisy De La Rosa MD               Results from last 7 days   Lab Units 07/20/25 0444 07/19/25 0614 07/18/25 0437 07/17/25  2106 07/17/25  1118   WBC Thousand/uL 17.26* 13.45* 11.98* 10.15 9.18   HEMOGLOBIN g/dL 12.2 13.2 13.4 15.2 15.5   HEMATOCRIT % 36.6 40.0 41.2 46.1 51.1*   PLATELETS Thousands/uL 301 392* 329 385 297   TOTAL NEUT ABS Thousands/µL  --   --  9.14*  --  6.15         Results from last 7 days   Lab Units 07/20/25 0444 07/19/25 0614 07/18/25 0437  07/17/25  2106 07/17/25  1118   SODIUM mmol/L 137 136 138 137 138   POTASSIUM mmol/L 3.8 3.6 3.6 3.7 3.1*   CHLORIDE mmol/L 100 100 103 101 105   CO2 mmol/L 27 27 25 26 22   ANION GAP mmol/L 10 9 10 10 11   BUN mg/dL 26* 17 13 12 18   CREATININE mg/dL 1.69* 1.06 0.86 0.83 0.82   EGFR ml/min/1.73sq m 45 79 99 100 100   CALCIUM mg/dL 9.7 9.5 9.4 9.8 10.0   MAGNESIUM mg/dL  --   --  1.8*  --   --      Results from last 7 days   Lab Units 07/20/25  0444 07/19/25  0614 07/18/25  0437 07/17/25  1118   AST U/L 14 16 15 16   ALT U/L 14 19 16 14   ALK PHOS U/L 85 93 90 103   TOTAL PROTEIN g/dL 6.9 7.2 7.1 7.9   ALBUMIN g/dL 3.8 4.3 4.1 4.8   TOTAL BILIRUBIN mg/dL 1.60* 0.84 0.63 0.65     Results from last 7 days   Lab Units 07/20/25  0550 07/19/25  2057 07/19/25  1534 07/19/25  1100 07/19/25  0559 07/18/25  2046 07/18/25  1554 07/18/25  1428 07/18/25  1043 07/18/25  0534 07/18/25  0034 07/17/25  1125   POC GLUCOSE mg/dl 175* 251* 175* 220* 192* 262* 164* 138 135 126 212* 283*     Results from last 7 days   Lab Units 07/20/25  0444 07/19/25  0614 07/18/25  0437 07/17/25  2106 07/17/25  1118   GLUCOSE RANDOM mg/dL 168* 186* 127 262* 252*         Results from last 7 days   Lab Units 07/18/25  0034   HEMOGLOBIN A1C % 7.5*   EAG mg/dl 169     Results from last 7 days   Lab Units 07/18/25  0437   PROTIME seconds 14.1   INR  1.06   PTT seconds 27     Results from last 7 days   Lab Units 07/17/25  1118   LIPASE u/L 25       Past Medical History[1]  Present on Admission:   Pancreatic mass   Type 2 diabetes mellitus (HCC)   Hypertension   Hyperlipidemia      Admitting Diagnosis: Vomiting [R11.10]  Abdominal pain [R10.9]  Pancreatic mass [K86.89]  Age/Sex: 55 y.o. male    Network Utilization Review Department  ATTENTION: Please call with any questions or concerns to [phone] and carefully listen to the prompts so that you are directed to the right person. All voicemails are confidential.   For Discharge needs, contact Care  Management DC Support Team at [phone] opt. 2  Send all requests for admission clinical reviews, approved or denied determinations and any other requests to dedicated fax number below belonging to the campus where the patient is receiving treatment. List of dedicated fax numbers for the Facilities:  FACILITY NAME UR FAX NUMBER   ADMISSION DENIALS (Administrative/Medical Necessity) [phone]   DISCHARGE SUPPORT TEAM (NETWORK) [phone]   PARENT CHILD HEALTH (Maternity/NICU/Pediatrics) [phone]   [facility] [phone]   [facility] [phone]   [facility] [phone]   [facility] [phone]   [facility] [phone]   [facility] [phone]   [facility] [phone]   [facility] [phone]   [facility] [phone]   [facility] [phone]   [facility] [phone]   [facility] [phone]              [1]   Past Medical History:  Diagnosis Date    Diabetes mellitus (HCC)     Hypertension

## 2025-07-21 ENCOUNTER — DOCUMENTATION (OUTPATIENT)
Dept: HEMATOLOGY ONCOLOGY | Facility: CLINIC | Age: 55
End: 2025-07-21

## 2025-07-21 ENCOUNTER — PATIENT OUTREACH (OUTPATIENT)
Dept: HEMATOLOGY ONCOLOGY | Facility: CLINIC | Age: 55
End: 2025-07-21

## 2025-07-21 LAB
ALBUMIN SERPL BCG-MCNC: 3.3 G/DL (ref 3.5–5)
ALP SERPL-CCNC: 77 U/L (ref 34–104)
ALT SERPL W P-5'-P-CCNC: 11 U/L (ref 7–52)
ANION GAP SERPL CALCULATED.3IONS-SCNC: 10 MMOL/L (ref 4–13)
AST SERPL W P-5'-P-CCNC: 14 U/L (ref 13–39)
BILIRUB SERPL-MCNC: 1.41 MG/DL (ref 0.2–1)
BUN SERPL-MCNC: 20 MG/DL (ref 5–25)
CALCIUM ALBUM COR SERPL-MCNC: 9.8 MG/DL (ref 8.3–10.1)
CALCIUM SERPL-MCNC: 9.2 MG/DL (ref 8.4–10.2)
CEA SERPL-MCNC: 1.5 NG/ML (ref 0–3)
CHLORIDE SERPL-SCNC: 99 MMOL/L (ref 96–108)
CO2 SERPL-SCNC: 28 MMOL/L (ref 21–32)
CREAT SERPL-MCNC: 1.02 MG/DL (ref 0.6–1.3)
ERYTHROCYTE [DISTWIDTH] IN BLOOD BY AUTOMATED COUNT: 13.7 % (ref 11.6–15.1)
GFR SERPL CREATININE-BSD FRML MDRD: 83 ML/MIN/1.73SQ M
GLUCOSE SERPL-MCNC: 140 MG/DL (ref 65–140)
GLUCOSE SERPL-MCNC: 149 MG/DL (ref 65–140)
GLUCOSE SERPL-MCNC: 150 MG/DL (ref 65–140)
GLUCOSE SERPL-MCNC: 171 MG/DL (ref 65–140)
HCT VFR BLD AUTO: 33.9 % (ref 36.5–49.3)
HGB BLD-MCNC: 11.1 G/DL (ref 12–17)
MCH RBC QN AUTO: 28 PG (ref 26.8–34.3)
MCHC RBC AUTO-ENTMCNC: 32.7 G/DL (ref 31.4–37.4)
MCV RBC AUTO: 85 FL (ref 82–98)
PLATELET # BLD AUTO: 258 THOUSANDS/UL (ref 149–390)
PMV BLD AUTO: 11.3 FL (ref 8.9–12.7)
POTASSIUM SERPL-SCNC: 3.5 MMOL/L (ref 3.5–5.3)
PROCALCITONIN SERPL-MCNC: 2.1 NG/ML
PROCALCITONIN SERPL-MCNC: 2.58 NG/ML
PROT SERPL-MCNC: 6.3 G/DL (ref 6.4–8.4)
RBC # BLD AUTO: 3.97 MILLION/UL (ref 3.88–5.62)
SODIUM SERPL-SCNC: 137 MMOL/L (ref 135–147)
WBC # BLD AUTO: 17.43 THOUSAND/UL (ref 4.31–10.16)

## 2025-07-21 PROCEDURE — 80053 COMPREHEN METABOLIC PANEL: CPT | Performed by: NURSE PRACTITIONER

## 2025-07-21 PROCEDURE — 82948 REAGENT STRIP/BLOOD GLUCOSE: CPT

## 2025-07-21 PROCEDURE — 99232 SBSQ HOSP IP/OBS MODERATE 35: CPT | Performed by: INTERNAL MEDICINE

## 2025-07-21 PROCEDURE — 82378 CARCINOEMBRYONIC ANTIGEN: CPT | Performed by: PHYSICIAN ASSISTANT

## 2025-07-21 PROCEDURE — 86301 IMMUNOASSAY TUMOR CA 19-9: CPT | Performed by: PHYSICIAN ASSISTANT

## 2025-07-21 PROCEDURE — 86316 IMMUNOASSAY TUMOR OTHER: CPT | Performed by: PHYSICIAN ASSISTANT

## 2025-07-21 PROCEDURE — 84145 PROCALCITONIN (PCT): CPT | Performed by: STUDENT IN AN ORGANIZED HEALTH CARE EDUCATION/TRAINING PROGRAM

## 2025-07-21 PROCEDURE — 85027 COMPLETE CBC AUTOMATED: CPT | Performed by: NURSE PRACTITIONER

## 2025-07-21 PROCEDURE — 99232 SBSQ HOSP IP/OBS MODERATE 35: CPT | Performed by: STUDENT IN AN ORGANIZED HEALTH CARE EDUCATION/TRAINING PROGRAM

## 2025-07-21 RX ADMIN — AMLODIPINE BESYLATE 10 MG: 10 TABLET ORAL at 10:49

## 2025-07-21 RX ADMIN — PANTOPRAZOLE SODIUM 40 MG: 40 TABLET, DELAYED RELEASE ORAL at 05:51

## 2025-07-21 RX ADMIN — DOCUSATE SODIUM 100 MG: 100 CAPSULE, LIQUID FILLED ORAL at 10:49

## 2025-07-21 RX ADMIN — INSULIN LISPRO 1 UNITS: 100 INJECTION, SOLUTION INTRAVENOUS; SUBCUTANEOUS at 12:33

## 2025-07-21 RX ADMIN — OXYCODONE HYDROCHLORIDE 10 MG: 10 TABLET ORAL at 17:07

## 2025-07-21 RX ADMIN — ACETAMINOPHEN 975 MG: 325 TABLET ORAL at 12:29

## 2025-07-21 RX ADMIN — ACETAMINOPHEN 975 MG: 325 TABLET ORAL at 23:08

## 2025-07-21 RX ADMIN — ACETAMINOPHEN 975 MG: 325 TABLET ORAL at 05:51

## 2025-07-21 RX ADMIN — ACETAMINOPHEN 975 MG: 325 TABLET ORAL at 17:07

## 2025-07-21 RX ADMIN — OXYCODONE HYDROCHLORIDE 10 MG: 10 TABLET ORAL at 03:56

## 2025-07-21 RX ADMIN — POLYETHYLENE GLYCOL 3350 17 G: 17 POWDER, FOR SOLUTION ORAL at 10:49

## 2025-07-21 RX ADMIN — LIDOCAINE 1 PATCH: 50 PATCH CUTANEOUS at 23:08

## 2025-07-21 NOTE — ANESTHESIA POSTPROCEDURE EVALUATION
Post-Op Assessment Note    CV Status:  Stable  Pain Score: 0    Pain management: adequate       Mental Status:  Alert   Hydration Status:  Stable   PONV Controlled:  None   Airway Patency:  Patent     Post Op Vitals Reviewed: Yes    No anethesia notable event occurred.    Staff: Anesthesiologist         Last Filed PACU Vitals:  Vitals Value Taken Time   Temp 96.5 °F (35.8 °C) 07/18/25 15:12   Pulse 98 07/18/25 15:20   /99 07/18/25 15:27   Resp 20 07/18/25 15:20   SpO2 97 % 07/18/25 15:12       Modified Franklin:     Vitals Value Taken Time   Activity 2 07/18/25 15:28   Respiration 2 07/18/25 15:28   Circulation 2 07/18/25 15:28   Consciousness 2 07/18/25 15:28   Oxygen Saturation 2 07/18/25 15:28     Modified Franklin Score: 10

## 2025-07-21 NOTE — PROGRESS NOTES
Progress Note - Hospitalist   Name: Elliot Woods 55 y.o. male I MRN: [id number]  [facility] I Date of Admission: 7/17/2025   Date of Service: 7/21/2025 I Hospital Day: 3    Assessment & Plan  Pancreatic mass  Presented with abd pain and vomiting, lipase negative. Patient moved to the area recently but previously followed at [facility].  Noted to have abd pain, N/V for several months. 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  It does not appear during that EUS that any notes were made regarding any biopsies of any pancreatic masses and unclear if mass was known then. Subsequent CT A/P on 5/31/25 at [facility] which showed a 1.5 cm pancreatic head enhancing nodule suspicious for a neuroendocrine tumor at that time for which EUS and tissue sampling was recommended but wasn't yet performed  CT abdomen pelvis with contrast with a 1.8 cm hyperenhancing lesion in the pancreatic neck, raising concern for neoplasm.    GI consulted   S/p EUS 7/18 unfortunately unable to identify mass but noted subtle hypoechoic area at the neck of the pancreas which was biopsied   MRI of the abdomen with and without contrast 7/20/25- Corresponding to CT findings, there is a 1.4 cm well-circumscribed, hyperenhancing pancreatic nodule, likely neuroendocrine tumor. There is a new 9.0 x 7.6 x 7.2 cm nonenhancing mass abutting the pancreatic tail, consistent with hematoma, without active hemorrhage. There is also fat stranding surrounding the hematoma and pancreatic tail. Please correlate with laboratory findings for evidence of acute pancreatitis.  Per GI continue to monitor given hematoma- hgb stable, abdominal pain improving. no evidence of active bleeding  leukocytosis likely reactive, will observe off of antibiotics at this time.   Now tolerating full  diet  Appreciate GI recommendations  Gastric nodule  Patient previously evaluated by gastroenterology at [facility]  He underwent an endoscopic ultrasound on 4/23/2025 which revealed a submucosal nodule in the gastric fundus. Gastric biopsies revealed severely chronic gastritis with no evidence of intestinal metaplasia or H. pylori. 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 Patient does have history of gastric cancer.   Endoscopic ultrasound performed this admission with normal-appearing stomach.   Hyperlipidemia  T bili mildly elevated 1.4 continue to monitor  Hypertension  On PTA Norvasc   PTA on benacar- started losartan 50mg daily 7/19 and noted to have COLLIN today, will hold and monitor off of ARB  Avoid hypotension and nephrotoxic drugs   BP remains controlled  Type 2 diabetes mellitus (HCC)  Lab Results   Component Value Date    HGBA1C 7.5 (H) 07/18/2025       Recent Labs     07/20/25  1650 07/20/25  2056 07/20/25  2228 07/21/25  0756   POCGLU 137 170* 141* 140       Blood Sugar Average: Last 72 hrs:  (P) 175.75    Hold metformin   C/w SSI coverage   Hypoglycemic protocol  Class 3 severe obesity due to excess calories with serious comorbidity and body mass index (BMI) of 40.0 to 44.9 in adult  BMI 39.85  Affecting all facets of life  Encourage diet weight loss  COLLIN (acute kidney injury) (HCC)  Lab Results   Component Value Date    CREATININE 1.02 07/21/2025    CREATININE 1.69 (H) 07/20/2025    CREATININE 1.06 07/19/2025   In the setting of dye exposure, poor PO intake/prerenal d/t abd pain/n/v and ARB use   Has since resolved with IV fluids  Continue to encourage p.o. intake    Leukocytosis  Noted to be trending up: 13.45--> 17.23  Could be reactive in the setting of above   Noted to have temperature of 101.3 overnight  Suspect likely reactive  No obvious source of infection  WBC count currently stable at 17K  Blood  cultures obtained  Will monitor closely off antibiotics for now  Peripancreatic hematoma  As noted on MRI  Hemoglobin mildly decreased today to 11 from 12  Continue to monitor hemoglobin    VTE Pharmacologic Prophylaxis: VTE Score: 2 Low Risk (Score 0-2) - Encourage Ambulation.    Mobility:   Basic Mobility Inpatient Raw Score: 24  JH-HLM Goal: 8: Walk 250 feet or more  JH-HLM Achieved: 7: Walk 25 feet or more  JH-HLM Goal achieved. Continue to encourage appropriate mobility.    Patient Centered Rounds: I performed bedside rounds with nursing staff today.   Discussions with Specialists or Other Care Team Provider: cm, nursing    Education and Discussions with Family / Patient: Patient declined call to .     Current Length of Stay: 3 day(s)  Current Patient Status: Inpatient   Certification Statement: The patient will continue to require additional inpatient hospital stay due to see below  Discharge Plan: Pending further monitoring of fever and hemoglobin possible discharge tomorrow if hemoglobin stable and fever resolves    Code Status: Level 1 - Full Code    Subjective   Denies chest pain, cough, fevers, chills    Objective :  Temp:  [97.5 °F (36.4 °C)-102.8 °F (39.3 °C)] 99.3 °F (37.4 °C)  HR:  [] 115  BP: (124-147)/(84-97) 130/92  Resp:  [16-17] 16  SpO2:  [89 %-95 %] 95 %  O2 Device: None (Room air)    Body mass index is 39.85 kg/m².     Input and Output Summary (last 24 hours):     Intake/Output Summary (Last 24 hours) at 7/21/2025 1017  Last data filed at 7/21/2025 0618  Gross per 24 hour   Intake --   Output 500 ml   Net -500 ml       Physical Exam  Constitutional:       General: He is not in acute distress.     Appearance: He is well-developed. He is not diaphoretic.   HENT:      Head: Normocephalic and atraumatic.      Nose: Nose normal.      Mouth/Throat:      Pharynx: No oropharyngeal exudate.     Eyes:      General: No scleral icterus.     Conjunctiva/sclera: Conjunctivae normal.        Cardiovascular:      Rate and Rhythm: Normal rate and regular rhythm.      Heart sounds: Normal heart sounds. No murmur heard.     No friction rub. No gallop.   Pulmonary:      Effort: Pulmonary effort is normal. No respiratory distress.      Breath sounds: Normal breath sounds. No wheezing or rales.   Chest:      Chest wall: No tenderness.   Abdominal:      General: Bowel sounds are normal. There is no distension.      Palpations: Abdomen is soft.      Tenderness: There is no abdominal tenderness. There is no guarding.     Musculoskeletal:         General: No tenderness or deformity. Normal range of motion.      Cervical back: Normal range of motion and neck supple.     Skin:     General: Skin is warm and dry.      Findings: No erythema.     Neurological:      Mental Status: He is alert. Mental status is at baseline.           Lines/Drains:              Lab Results: I have reviewed the following results:   Results from last 7 days   Lab Units 07/21/25  0546 07/19/25  0614 07/18/25  0437   WBC Thousand/uL 17.43*   < > 11.98*   HEMOGLOBIN g/dL 11.1*   < > 13.4   HEMATOCRIT % 33.9*   < > 41.2   PLATELETS Thousands/uL 258   < > 329   SEGS PCT %  --   --  77*   LYMPHO PCT %  --   --  14   MONO PCT %  --   --  9   EOS PCT %  --   --  0    < > = values in this interval not displayed.     Results from last 7 days   Lab Units 07/21/25  0546   SODIUM mmol/L 137   POTASSIUM mmol/L 3.5   CHLORIDE mmol/L 99   CO2 mmol/L 28   BUN mg/dL 20   CREATININE mg/dL 1.02   ANION GAP mmol/L 10   CALCIUM mg/dL 9.2   ALBUMIN g/dL 3.3*   TOTAL BILIRUBIN mg/dL 1.41*   ALK PHOS U/L 77   ALT U/L 11   AST U/L 14   GLUCOSE RANDOM mg/dL 171*     Results from last 7 days   Lab Units 07/18/25  0437   INR  1.06     Results from last 7 days   Lab Units 07/21/25  0756 07/20/25  2228 07/20/25  2056 07/20/25  1650 07/20/25  1112 07/20/25  0550 07/19/25  2057 07/19/25  1534 07/19/25  1100 07/19/25  0559 07/18/25  2046 07/18/25  1554   POC GLUCOSE  mg/dl 140 141* 170* 137 174* 175* 251* 175* 220* 192* 262* 164*     Results from last 7 days   Lab Units 07/18/25  0034   HEMOGLOBIN A1C % 7.5*     Results from last 7 days   Lab Units 07/21/25  0546 07/20/25  2322 07/20/25  2312   LACTIC ACID mmol/L  --   --  1.1   PROCALCITONIN ng/ml 2.58* 2.10*  --        Recent Cultures (last 7 days):   Results from last 7 days   Lab Units 07/20/25  2322   BLOOD CULTURE  Received in Microbiology Lab. Culture in [facility].  Received in Microbiology Lab. Culture in [facility].       Imaging Results Review: I personally reviewed the following image studies/reports in PACS and discussed pertinent findings with Radiology: chest xray. My interpretation of the radiology images/reports is:  .  Other Study Results Review: EKG was reviewed.     Last 24 Hours Medication List:     Current Facility-Administered Medications:     acetaminophen (TYLENOL) tablet 975 mg, Q6H DEJUAN    amLODIPine (NORVASC) tablet 10 mg, Daily    [Held by provider] atorvastatin (LIPITOR) tablet 40 mg, Daily    bisacodyl (DULCOLAX) rectal suppository 10 mg, Daily PRN    docusate sodium (COLACE) capsule 100 mg, BID    hydrALAZINE (APRESOLINE) injection 5 mg, Q6H PRN    hydrOXYzine HCL (ATARAX) tablet 25 mg, Q6H PRN    insulin lispro (HumALOG/ADMELOG) 100 units/mL subcutaneous injection 1-5 Units, TID With Meals **AND** Fingerstick Glucose (POCT), 4x Daily AC and at bedtime    lidocaine (LIDODERM) 5 % patch 1 patch, Daily    metoclopramide (REGLAN) injection 5 mg, Q6H PRN    multi-electrolyte (Plasmalyte-A/Isolyte-S PH 7.4/Normosol-R) IV solution, Continuous, Last Rate: 125 mL/hr (07/20/25 1708)    ondansetron (ZOFRAN) injection 4 mg, Q6H PRN    oxyCODONE (ROXICODONE) IR tablet 5 mg, Q4H PRN **OR** oxyCODONE (ROXICODONE) immediate release tablet 10 mg, Q4H PRN    pantoprazole (PROTONIX) EC tablet 40 mg, Early Morning    polyethylene glycol (MIRALAX) packet 17 g, Daily    senna (SENOKOT) tablet 17.2 mg, Daily PRN     simethicone (MYLICON) chewable tablet 80 mg, Q6H PRN    Administrative Statements   Today, Patient Was Seen By: Wolf Riley MD  I have spent a total time of 30 minutes in caring for this patient on the day of the visit/encounter including Diagnostic results.    **Please Note: This note may have been constructed using a voice recognition system.**

## 2025-07-21 NOTE — SEPSIS NOTE
Sepsis Note   Elliot Woods 55 y.o. male MRN: [id number]  Unit/Bed#: [facility] 931-01 Encounter: [id number]       Initial Sepsis Screening       Row Name 07/20/25 2241                Is the patient's history suggestive of a new or worsening infection? Yes (Proceed)  -CR        Suspected source of infection suspect infection, source unknown  -CR        Indicate SIRS criteria Hyperthemia > 38.3C (100.9F) OR Hypothermia <36C (96.8F);Tachycardia > 90 bpm;Leukocytosis (WBC > 32458 IJL) OR Leukopenia (WBC <4000 IJL) OR Bandemia (WBC >10% bands)  -CR        Are two or more of the above signs & symptoms of infection both present and new to the patient? Yes (Proceed)  -CR        Assess for evidence of organ dysfunction: Are any of the below criteria present within 6 hours of suspected infection and SIRS criteria that are NOT considered to be chronic conditions? --                  User Key  (r) = Recorded By, (t) = Taken By, (c) = Cosigned By      Initials Name Provider Type    ALESSIA Hernandez PA-C Physician Assistant                   Initial Sepsis Screening       Row Name 07/20/25 2241                   Initial Sepsis Assessment    Is the patient's history suggestive of a new or worsening infection? Yes (Proceed)  -CR        Suspected source of infection suspect infection, source unknown  -CR        Indicate SIRS criteria Hyperthemia > 38.3C (100.9F) OR Hypothermia <36C (96.8F);Tachycardia > 90 bpm;Leukocytosis (WBC > 29203 IJL) OR Leukopenia (WBC <4000 IJL) OR Bandemia (WBC >10% bands)  -CR        Are two or more of the above signs & symptoms of infection both present and new to the patient? Yes (Proceed)  -CR                  User Key  (r) = Recorded By, (t) = Taken By, (c) = Cosigned By      Initials Name Provider Type    ALESSIA Hernandez PA-C Physician Assistant                         Body mass index is 39.85 kg/m².  Wt Readings from Last 1 Encounters:   07/18/25 105 kg (232 lb 2.3 oz)        Ideal body  weight: 59.2 kg (130 lb 8.2 oz)  Adjusted ideal body weight: 77.6 kg (171 lb 2.6 oz)      Pt admitted with pancreatic mass, concerning for neoplasm, presenting with the above sepsis criteria.  MRI done 7/20/25 does show possible pancreatitis, could be possible source. Will initiate sepsis protocol with initial labs and continued vital monitoring.  Will administer a small fluid bolus for fever on 102.8 along with tylenol.  Pt has remained relatively hypertensive, pending results of labs will hold off on further fluid resuscitation.  Had been having low grade fevers and leukocytosis throughout the day 7/20/25, thought to be in the setting of the pancreatic mass, gastric nodule, and MRI findings.     Given that now he has spiked a fever, he's tachycardic and somewhat hypoxic, will follow sepsis protocol and follow up as necessary.       Reevaluation of pt 0000 7/21/25  Pt awake and resting in bed comfortable, in no acute distress. His oxygen saturations were 95-96% while I was talking to him about his labs and what we think may be causing the fever.    I got him weaned off the NC, pt without complaints of SOB/CP, lungs are clear in all fields.  Will hold off on CXR at this time.    Procal was elevated at 2.10, lactic acid normal.  If pt does start to have higher oxygen requirements, low threshold for chest imaging. Given his very stable clinical picture, will hold off on abx at this time.

## 2025-07-21 NOTE — ASSESSMENT & PLAN NOTE
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  CTAP showed 1.8 cm hyperenhancing lesion in the pancreatic neck concerning for neoplasm.  Of note, a 1.5 cm pancreatic lesion was seen on his CT scan on May 31 at [facility].  Patient was recommended EUS at that time but this was not performed.  He also has a family history of gastric cancer in aunt and liver cancer in mother.    He underwent an EUS 7/18/2025 however, the hyperenhancing lesion seen on CT scan was not clearly visualized.  There was a subtle 1.2 to 1.8 cm hyperechoic area in the pancreatic neck.  FNA of this area was obtained.  Given his inconclusive EUS findings and severe abdominal pain for procedure, an MRI was performed for further investigation and to delineate CT scan findings.  MRI/MRCP 7/20 showed 1.4 cm well-circumscribed hyperenhancing pancreatic nodule likely a neuroendocrine tumor.  There was also finding of a new 9 x 7.6 x 7.2 nonenhancing mass with fat stranding consistent with a hematoma abutting the pancreatic tail without active hemorrhage.    Suspect his postprocedural abdominal pain likely secondary to hematoma and inflammation likely pancreatitis.  Fortunately, his abdominal pain is much improved.  He was able to ambulate and tolerated diet.  He does have leukocytosis but I suspect this is reactive as he has been afebrile.      Plan  Otherwise appropriate for discharge from a GI perspective if he remains afebrile for the next 24 hours.  Okay to monitor off antibiotics. Febrile overnight which could be secondary to pancreatitis versus hematoma.  His fever color is improving  Repeat CT abdomen and pelvis in 2 to 4 weeks to reassess hematoma  Will arrange for outpatient follow-up with  advanced endoscopy team.  If his FNA results are inconclusive, may likely need a repeat EUS  Okay to advance to low fiber/low residue diet  Recommend MiraLAX +/- Dulcolax for bowel regimen while patient is in opioid  Recommend follow-up outpatient with surgical oncology.  Appreciate recommendations  Additional pain and symptom management per primary team

## 2025-07-21 NOTE — ASSESSMENT & PLAN NOTE
On PTA [facility]   PTA on benacar- started losartan 50mg daily 7/19 and noted to have COLLIN today, will hold and monitor off of ARB  Avoid hypotension and nephrotoxic drugs   BP remains controlled

## 2025-07-21 NOTE — ASSESSMENT & PLAN NOTE
Noted to be trending up: 13.45--> 17.23  Could be reactive in the setting of above   Noted to have temperature of 101.3 overnight  Suspect likely reactive  No obvious source of infection  WBC count currently stable at 17K  Blood cultures obtained  Will monitor closely off antibiotics for now

## 2025-07-21 NOTE — ASSESSMENT & PLAN NOTE
Lab Results   Component Value Date    HGBA1C 7.5 (H) 07/18/2025       Recent Labs     07/20/25  1650 07/20/25 2056 07/20/25  2228 07/21/25  0756   POCGLU 137 170* 141* 140       Blood Sugar Average: Last 72 hrs:  (P) 175.75    Hold metformin   C/w SSI coverage   Hypoglycemic protocol

## 2025-07-21 NOTE — ASSESSMENT & PLAN NOTE
Presented with abd pain and vomiting, lipase negative. Patient moved to the area recently but previously followed at [facility].  Noted to have abd pain, N/V for several months. 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  It does not appear during that EUS that any notes were made regarding any biopsies of any pancreatic masses and unclear if mass was known then. Subsequent CT A/P on 5/31/25 at [facility] which showed a 1.5 cm pancreatic head enhancing nodule suspicious for a neuroendocrine tumor at that time for which EUS and tissue sampling was recommended but wasn't yet performed  CT abdomen pelvis with contrast with a 1.8 cm hyperenhancing lesion in the pancreatic neck, raising concern for neoplasm.    GI consulted   S/p EUS 7/18 unfortunately unable to identify mass but noted subtle hypoechoic area at the neck of the pancreas which was biopsied   MRI of the abdomen with and without contrast 7/20/25- Corresponding to CT findings, there is a 1.4 cm well-circumscribed, hyperenhancing pancreatic nodule, likely neuroendocrine tumor. There is a new 9.0 x 7.6 x 7.2 cm nonenhancing mass abutting the pancreatic tail, consistent with hematoma, without active hemorrhage. There is also fat stranding surrounding the hematoma and pancreatic tail. Please correlate with laboratory findings for evidence of acute pancreatitis.  Per GI continue to monitor given hematoma- hgb stable, abdominal pain improving. no evidence of active bleeding  leukocytosis likely reactive, will observe off of antibiotics at this time.   Now tolerating full diet  Appreciate GI recommendations   Detail Level: Detailed Gentle Skin Care Counseling: I recommended use a gentle skin cleanser when washing the skin. I also recommended application of a moisturizer twice daily. Products with fragrances, preservatives and dyes should be avoided.

## 2025-07-21 NOTE — ASSESSMENT & PLAN NOTE
Patient previously evaluated by gastroenterology at [facility]  He underwent an endoscopic ultrasound on 4/23/2025 which revealed a submucosal nodule in the gastric fundus. Gastric biopsies revealed severely chronic gastritis with no evidence of intestinal metaplasia or H. pylori. 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 Patient does have history of gastric cancer.   Endoscopic ultrasound performed this admission with normal-appearing stomach.

## 2025-07-21 NOTE — ASSESSMENT & PLAN NOTE
1.4 cm well-circumscribed, hyperenhancing pancreatic nodule, likely neuroendocrine tumor.    9.0 x 7.6 x 7.2 cm nonenhancing mass abutting the pancreatic tail, consistent with hematoma, without active hemorrhage    Plan:  - F/u FNA of pancreatic mass  - F/u Hgb  - F/u blood cultures  - Can follow up outpatient with FNA results

## 2025-07-21 NOTE — PROGRESS NOTES
PN please retrieve outside records. Please obtain all records since February 2025.  [] Email sent to pt with Authorization for Release of Health Information  Patient is scheduled for HFU for surgical oncology on 8/12/25.    Referral received/ Chart reviewed for work up completed     Imaging completed:    [] PET/CT   [] MRI   [x] CT 5/31/25 - CTA a/p w wo contrast ([facility], [facility], and [facility])   [x] US - EUS 4/23/25 ([facility], [facility], and [facility])   [x] 2/20/25 UPPER GI ENDOSCOPY WITH BIOPSY ([facility], [facility], and [facility])   [] Mammo   [] Bone scan   [] N/A    Pathology completed:    Date: 4/23/25 - stomach   Location: [facility], and [facility] [place] [facility]    []N/A    Additional records needed:    [] Genomic report   [] Genetic testing results   [x] Office Note - 3/25/25 (GI @ [facility]), 4/23/25 (GI  @ [facility])   [] Procedure/ Operative note   [] Lab results   [] N/A      [] Radiation Oncology records retrieval needed (PN to route to rad/onc clerical pool once scheduled)  Date:  Location:      Chart rvw'd on 7/21/2025    Referring Provider: HFU    Diagnosis: pancreatic mass    EGD/EUS: 7/18/25  Impression:  The esophagus, stomach and duodenum appeared normal.  Normal celiac takeoff.  No evidence of pancreatic ductal dilation throughout the examination.  Bile duct was normal caliber with no filling defects noted.  Ampulla appeared normal.  No lymphadenopathy appreciated.  The pancreatic parenchyma appearred lobular with a reticular pattern most notable at the neck/body of the pancreas.  The hyperenhancing lesion at the neck of the pancreas, seen on CT adjacent to the aorta/SMA was not clearly visualized on EUS.  There was a subtle hypoechoic area at the neck of the pancreas, that measured 1.2-1.8 cm at different  angles that could represent the lesion on CT, so this was biopsied using 25 gauge [employer] needle x 3 passes.    Pathology: active in process from 25    Imagin25 CTA a/p w wo contrast  1.5 cm pancreatic head enhancing nodule (604:61) suspicious for neuroendocrine tumor.  Recommend EUS and tissue sampling.   No CT correlate for known gastric fundus spindle cell tumor.     25 CT a/p w contrast  No acute findings in the abdomen or pelvis.  1.8 cm hyperenhancing lesion in the pancreatic neck, raising concern for neoplasm. Recommend MRI of the abdomen with and without contrast for complete evaluation.    25 MRI abdomen w wo contrast and mcrp  Corresponding to CT findings, there is a 1.4 cm well-circumscribed, hyperenhancing pancreatic nodule, likely neuroendocrine tumor (series 13 images [id number].)     There is a new 9.0 x 7.6 x 7.2 cm nonenhancing mass abutting the pancreatic tail, consistent with hematoma, without active hemorrhage (series 6 images 22-36.)     There is also fat stranding surrounding the hematoma and pancreatic tail. Please correlate with laboratory findings for evidence of acute pancreatitis.     Small amount of ascites, new compared to the 2025 CT.    Labs:  CBC & CMP UTD  CEA, Ca 19-9, Chromogranin A in process from 25.    Scheduled appointments:  Future Appointments   Date Time Provider Department Center   2025  9:15 AM Rylee Campos MD SURG [facility]C [facility]

## 2025-07-21 NOTE — ASSESSMENT & PLAN NOTE
Lab Results   Component Value Date    CREATININE 1.02 07/21/2025    CREATININE 1.69 (H) 07/20/2025    CREATININE 1.06 07/19/2025   In the setting of dye exposure, poor PO intake/prerenal d/t abd pain/n/v and ARB use   Has since resolved with IV fluids  Continue to encourage p.o. intake

## 2025-07-21 NOTE — PROGRESS NOTES
Intake received, chart reviewed for need of external records.  Consulting: Dr. Campos   Scheduled on 8/12/2025  Dx: pancreatic mass        Pathology slides requested:   From [facility] [employer]   phone [phone], via fax [phone]  Accession # [id number]  Slides will be sent directly to [facility] Pathology lab at [address] [place], [place] [address] Way.     Request form scanned into chart under media

## 2025-07-21 NOTE — PROGRESS NOTES
Progress Note - Oncology-Surgical   Name: Elliot Woods 55 y.o. male I MRN: [id number]  Unit/Bed#: [facility] 931-01 I Date of Admission: 7/17/2025   Date of Service: 7/21/2025 I Hospital Day: 3    Assessment & Plan  Pancreatic mass  Gastric nodule  Peripancreatic hematoma  1.4 cm well-circumscribed, hyperenhancing pancreatic nodule, likely neuroendocrine tumor.    9.0 x 7.6 x 7.2 cm nonenhancing mass abutting the pancreatic tail, consistent with hematoma, without active hemorrhage    Plan:  - F/u FNA of pancreatic mass  - F/u Hgb  - F/u blood cultures  - Can follow up outpatient with FNA results        Please contact the SecureChat role for the Oncology-Surgical service with any questions/concerns.    Subjective   Overnight, the patient had a fever to 102.8, was giving 250cc bolus. He is currently afebrile. He has minimal abd pain. He denies n/v, cp, sob. He is passing flatus but has not had any bowel movements.     Objective :  Temp:  [97.5 °F (36.4 °C)-102.8 °F (39.3 °C)] 98.9 °F (37.2 °C)  HR:  [] 99  BP: (124-150)/(77-97) 147/95  Resp:  [16-18] 17  SpO2:  [89 %-95 %] 93 %  O2 Device: None (Room air)    I/O         07/19 0701  07/20 0700 07/20 0701  07/21 0700    Urine (mL/kg/hr)  150 (0.1)    Total Output  150    Net  -150          Unmeasured Urine Occurrence 0 x             Physical Exam  General: NAD  HENT: NCAT MMM  Neck: supple, no JVD  CV: nl rate  Lungs: nl wob. No resp distress  ABD: Soft, LUQ ttp, distended  Extrem: No CCE  Neuro: AAOx3      Lab Results: I have reviewed the following results:  Recent Labs     07/20/25  0444 07/20/25  2312   WBC 17.26*  --    HGB 12.2  --    HCT 36.6  --      --    SODIUM 137  --    K 3.8  --      --    CO2 27  --    BUN 26*  --    CREATININE 1.69*  --    GLUC 168*  --    AST 14  --    ALT 14  --    ALB 3.8  --    TBILI 1.60*  --    ALKPHOS 85  --    LACTICACID  --  1.1         VTE Pharmacologic Prophylaxis: VTE covered by:    None      VTE Mechanical  Prophylaxis: sequential compression device    Reese Lamb MD  General Surgery, PGY-1

## 2025-07-21 NOTE — PLAN OF CARE
Problem: PAIN - ADULT  Goal: Verbalizes/displays adequate comfort level or baseline comfort level  Description: Interventions:  - Encourage patient to monitor pain and request assistance  - Assess pain using appropriate pain scale  - Administer analgesics as ordered based on type and severity of pain and evaluate response  - Implement non-pharmacological measures as appropriate and evaluate response  - Consider cultural and social influences on pain and pain management  - Notify physician/advanced practitioner if interventions unsuccessful or patient reports new pain  - Educate patient/family on pain management process including their role and importance of  reporting pain   - Provide non-pharmacologic/complimentary pain relief interventions  Outcome: Progressing     Problem: INFECTION - ADULT  Goal: Absence or prevention of progression during hospitalization  Description: INTERVENTIONS:  - Assess and monitor for signs and symptoms of infection  - Monitor lab/diagnostic results  - Monitor all insertion sites, i.e. indwelling lines, tubes, and drains  - Monitor endotracheal if appropriate and nasal secretions for changes in amount and color  - [place] appropriate cooling/warming therapies per order  - Administer medications as ordered  - Instruct and encourage patient and family to use good hand hygiene technique  - Identify and instruct in appropriate isolation precautions for identified infection/condition  Outcome: Progressing     Problem: SAFETY ADULT  Goal: Patient will remain free of falls  Description: INTERVENTIONS:  - Educate patient/family on patient safety including physical limitations  - Instruct patient to call for assistance with activity   - Consider consulting OT/PT to assist with strengthening/mobility based on AM PAC & JH-HLM score  - Consult OT/PT to assist with strengthening/mobility   - Keep Call bell within reach  - Keep bed low and locked with side rails adjusted as appropriate  - Keep  care items and personal belongings within reach  - Initiate and maintain comfort rounds  - Make Fall Risk Sign visible to staff  - Offer Toileting every 2 Hours, in advance of need  - Initiate/Maintain alarm  - Obtain necessary fall risk management equipment:  - Apply yellow socks and bracelet for high fall risk patients  - Consider moving patient to room near nurses station  Outcome: Progressing     Problem: DISCHARGE PLANNING  Goal: Discharge to home or other facility with appropriate resources  Description: INTERVENTIONS:  - Identify barriers to discharge w/patient and caregiver  - Arrange for needed discharge resources and transportation as appropriate  - Identify discharge learning needs (meds, wound care, etc.)  - Arrange for interpretive services to assist at discharge as needed  - Refer to Case Management Department for coordinating discharge planning if the patient needs post-hospital services based on physician/advanced practitioner order or complex needs related to functional status, cognitive ability, or social support system  Outcome: Progressing     Problem: Knowledge Deficit  Goal: Patient/family/caregiver demonstrates understanding of disease process, treatment plan, medications, and discharge instructions  Description: Complete learning assessment and assess knowledge base.  Interventions:  - Provide teaching at level of understanding  - Provide teaching via preferred learning methods  Outcome: Progressing     Problem: Nutrition/Hydration-ADULT  Goal: Nutrient/Hydration intake appropriate for improving, restoring or maintaining nutritional needs  Description: Monitor and assess patient's nutrition/hydration status for malnutrition. Collaborate with interdisciplinary team and initiate plan and interventions as ordered.  Monitor patient's weight and dietary intake as ordered or per policy. Utilize nutrition screening tool and intervene as necessary. Determine patient's food preferences and provide  high-protein, high-caloric foods as appropriate.     INTERVENTIONS:  - Monitor oral intake, urinary output, labs, and treatment plans  - Assess nutrition and hydration status and recommend course of action  - Evaluate amount of meals eaten  - Assist patient with eating if necessary   - Allow adequate time for meals  - Recommend/ encourage appropriate diets, oral nutritional supplements, and vitamin/mineral supplements  - Order, calculate, and assess calorie counts as needed  - Recommend, monitor, and adjust tube feedings and TPN/PPN based on assessed needs  - Assess need for intravenous fluids  - Provide specific nutrition/hydration education as appropriate  - Include patient/family/caregiver in decisions related to nutrition  Outcome: Progressing

## 2025-07-21 NOTE — ASSESSMENT & PLAN NOTE
9 cm peripancreatic fluid collection consistent with a hematoma without active extravasation.  Hemoglobin stable and patient's abdominal pain much improved.    - This is likely a complication of his recent endoscopic procedure  - I recommend a CT abdomen outpatient in 3 to 4 weeks to assess stability of hematoma.

## 2025-07-21 NOTE — ASSESSMENT & PLAN NOTE
Patient previously evaluated by gastroenterology at [facility].  He underwent an endoscopic ultrasound on 4/23/2025 which revealed a submucosal nodule in the gastric fundus.  Gastric biopsies revealed severely chronic gastritis with no evidence of intestinal metaplasia or H. pylori.  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  Patient does have history of gastric cancer.  Endoscopic ultrasound performed this admission with normal-appearing stomach.

## 2025-07-22 ENCOUNTER — PREP FOR PROCEDURE (OUTPATIENT)
Dept: GASTROENTEROLOGY | Facility: CLINIC | Age: 55
End: 2025-07-22

## 2025-07-22 ENCOUNTER — PATIENT OUTREACH (OUTPATIENT)
Dept: HEMATOLOGY ONCOLOGY | Facility: CLINIC | Age: 55
End: 2025-07-22

## 2025-07-22 ENCOUNTER — TELEPHONE (OUTPATIENT)
Dept: GASTROENTEROLOGY | Facility: CLINIC | Age: 55
End: 2025-07-22

## 2025-07-22 VITALS
HEIGHT: 64 IN | TEMPERATURE: 98.9 F | OXYGEN SATURATION: 91 % | HEART RATE: 99 BPM | DIASTOLIC BLOOD PRESSURE: 83 MMHG | BODY MASS INDEX: 39.63 KG/M2 | SYSTOLIC BLOOD PRESSURE: 146 MMHG | WEIGHT: 232.14 LBS | RESPIRATION RATE: 16 BRPM

## 2025-07-22 DIAGNOSIS — K86.89 PANCREATIC MASS: Primary | ICD-10-CM

## 2025-07-22 LAB
ANION GAP SERPL CALCULATED.3IONS-SCNC: 6 MMOL/L (ref 4–13)
BASOPHILS # BLD AUTO: 0.03 THOUSANDS/ÂΜL (ref 0–0.1)
BASOPHILS NFR BLD AUTO: 0 % (ref 0–1)
BUN SERPL-MCNC: 17 MG/DL (ref 5–25)
CALCIUM SERPL-MCNC: 9.1 MG/DL (ref 8.4–10.2)
CANCER AG19-9 SERPL-ACNC: <2 U/ML (ref 0–35)
CHLORIDE SERPL-SCNC: 101 MMOL/L (ref 96–108)
CO2 SERPL-SCNC: 29 MMOL/L (ref 21–32)
CREAT SERPL-MCNC: 0.83 MG/DL (ref 0.6–1.3)
EOSINOPHIL # BLD AUTO: 0.08 THOUSAND/ÂΜL (ref 0–0.61)
EOSINOPHIL NFR BLD AUTO: 1 % (ref 0–6)
ERYTHROCYTE [DISTWIDTH] IN BLOOD BY AUTOMATED COUNT: 13.5 % (ref 11.6–15.1)
GFR SERPL CREATININE-BSD FRML MDRD: 100 ML/MIN/1.73SQ M
GLUCOSE SERPL-MCNC: 191 MG/DL (ref 65–140)
GLUCOSE SERPL-MCNC: 198 MG/DL (ref 65–140)
GLUCOSE SERPL-MCNC: 213 MG/DL (ref 65–140)
HCT VFR BLD AUTO: 32.4 % (ref 36.5–49.3)
HGB BLD-MCNC: 10.6 G/DL (ref 12–17)
IMM GRANULOCYTES # BLD AUTO: 0.09 THOUSAND/UL (ref 0–0.2)
IMM GRANULOCYTES NFR BLD AUTO: 1 % (ref 0–2)
LYMPHOCYTES # BLD AUTO: 0.94 THOUSANDS/ÂΜL (ref 0.6–4.47)
LYMPHOCYTES NFR BLD AUTO: 6 % (ref 14–44)
MCH RBC QN AUTO: 27.9 PG (ref 26.8–34.3)
MCHC RBC AUTO-ENTMCNC: 32.7 G/DL (ref 31.4–37.4)
MCV RBC AUTO: 85 FL (ref 82–98)
MONOCYTES # BLD AUTO: 1.63 THOUSAND/ÂΜL (ref 0.17–1.22)
MONOCYTES NFR BLD AUTO: 11 % (ref 4–12)
NEUTROPHILS # BLD AUTO: 12.29 THOUSANDS/ÂΜL (ref 1.85–7.62)
NEUTS SEG NFR BLD AUTO: 81 % (ref 43–75)
NRBC BLD AUTO-RTO: 0 /100 WBCS
PLATELET # BLD AUTO: 302 THOUSANDS/UL (ref 149–390)
PMV BLD AUTO: 10.9 FL (ref 8.9–12.7)
POTASSIUM SERPL-SCNC: 3.1 MMOL/L (ref 3.5–5.3)
RBC # BLD AUTO: 3.8 MILLION/UL (ref 3.88–5.62)
SODIUM SERPL-SCNC: 136 MMOL/L (ref 135–147)
WBC # BLD AUTO: 15.06 THOUSAND/UL (ref 4.31–10.16)

## 2025-07-22 PROCEDURE — 80048 BASIC METABOLIC PNL TOTAL CA: CPT | Performed by: INTERNAL MEDICINE

## 2025-07-22 PROCEDURE — 85025 COMPLETE CBC W/AUTO DIFF WBC: CPT | Performed by: INTERNAL MEDICINE

## 2025-07-22 PROCEDURE — 88305 TISSUE EXAM BY PATHOLOGIST: CPT | Performed by: PATHOLOGY

## 2025-07-22 PROCEDURE — 82948 REAGENT STRIP/BLOOD GLUCOSE: CPT

## 2025-07-22 PROCEDURE — 99239 HOSP IP/OBS DSCHRG MGMT >30: CPT | Performed by: INTERNAL MEDICINE

## 2025-07-22 RX ORDER — ACETAMINOPHEN 325 MG/1
975 TABLET ORAL EVERY 6 HOURS SCHEDULED
Start: 2025-07-22

## 2025-07-22 RX ORDER — METOCLOPRAMIDE HYDROCHLORIDE 5 MG/ML
5 INJECTION INTRAMUSCULAR; INTRAVENOUS EVERY 6 HOURS PRN
Status: DISCONTINUED | OUTPATIENT
Start: 2025-07-22 | End: 2025-07-22 | Stop reason: HOSPADM

## 2025-07-22 RX ORDER — SIMETHICONE 80 MG
80 TABLET,CHEWABLE ORAL EVERY 6 HOURS PRN
Start: 2025-07-22

## 2025-07-22 RX ORDER — POLYETHYLENE GLYCOL 3350 17 G/17G
17 POWDER, FOR SOLUTION ORAL DAILY
Start: 2025-07-23

## 2025-07-22 RX ORDER — DOCUSATE SODIUM 100 MG/1
100 CAPSULE, LIQUID FILLED ORAL 2 TIMES DAILY
Start: 2025-07-22

## 2025-07-22 RX ORDER — HYDROXYZINE HYDROCHLORIDE 25 MG/1
25 TABLET, FILM COATED ORAL EVERY 6 HOURS PRN
Start: 2025-07-22

## 2025-07-22 RX ORDER — SENNOSIDES 8.6 MG
17.2 TABLET ORAL DAILY PRN
Start: 2025-07-22

## 2025-07-22 RX ADMIN — ACETAMINOPHEN 975 MG: 325 TABLET ORAL at 05:32

## 2025-07-22 RX ADMIN — DOCUSATE SODIUM 100 MG: 100 CAPSULE, LIQUID FILLED ORAL at 09:58

## 2025-07-22 RX ADMIN — SIMETHICONE 80 MG: 80 TABLET, CHEWABLE ORAL at 03:33

## 2025-07-22 RX ADMIN — ACETAMINOPHEN 975 MG: 325 TABLET ORAL at 12:45

## 2025-07-22 RX ADMIN — PANTOPRAZOLE SODIUM 40 MG: 40 TABLET, DELAYED RELEASE ORAL at 05:33

## 2025-07-22 RX ADMIN — INSULIN LISPRO 1 UNITS: 100 INJECTION, SOLUTION INTRAVENOUS; SUBCUTANEOUS at 12:45

## 2025-07-22 RX ADMIN — AMLODIPINE BESYLATE 10 MG: 10 TABLET ORAL at 10:05

## 2025-07-22 RX ADMIN — INSULIN LISPRO 1 UNITS: 100 INJECTION, SOLUTION INTRAVENOUS; SUBCUTANEOUS at 10:00

## 2025-07-22 NOTE — ASSESSMENT & PLAN NOTE
Presented with abd pain and vomiting, lipase negative. Patient moved to the area recently but previously followed at [facility].  Noted to have abd pain, N/V for several months. 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  It does not appear during that EUS that any notes were made regarding any biopsies of any pancreatic masses and unclear if mass was known then. Subsequent CT A/P on 5/31/25 at [facility] which showed a 1.5 cm pancreatic head enhancing nodule suspicious for a neuroendocrine tumor at that time for which EUS and tissue sampling was recommended but wasn't yet performed  CT abdomen pelvis with contrast with a 1.8 cm hyperenhancing lesion in the pancreatic neck, raising concern for neoplasm.    GI consulted   S/p EUS 7/18 unfortunately unable to identify mass but noted subtle hypoechoic area at the neck of the pancreas which was biopsied   MRI of the abdomen with and without contrast 7/20/25- Corresponding to CT findings, there is a 1.4 cm well-circumscribed, hyperenhancing pancreatic nodule, likely neuroendocrine tumor. There is a new 9.0 x 7.6 x 7.2 cm nonenhancing mass abutting the pancreatic tail, consistent with hematoma, without active hemorrhage. There is also fat stranding surrounding the hematoma and pancreatic tail. Please correlate with laboratory findings for evidence of acute pancreatitis.  Per GI continue to monitor given hematoma- hgb stable, abdominal pain improving. no evidence of active bleeding  leukocytosis likely reactive, will observe off of antibiotics at this time.   Now tolerating full diet  Appreciate GI recommendations

## 2025-07-22 NOTE — ASSESSMENT & PLAN NOTE
On PTA [facility]   PTA on benacar- started losartan 50mg daily 7/19 and noted to have COLLIN today, will hold and monitor off of ARB  Avoid hypotension and nephrotoxic drugs   BP remains controlled

## 2025-07-22 NOTE — ASSESSMENT & PLAN NOTE
Patient previously evaluated by gastroenterology at [facility].  He underwent an endoscopic ultrasound on 4/23/2025 which revealed a submucosal nodule in the gastric fundus.  Gastric biopsies revealed severely chronic gastritis with no evidence of intestinal metaplasia or H. pylori.  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  Patient does have history of gastric cancer.  Endoscopic ultrasound performed this admission with normal-appearing stomach.

## 2025-07-22 NOTE — PROGRESS NOTES
Spoke with Lucy at [facility], she provided another fax number to send the pathology slide request form.   Pathology slide request form faxed to [phone]

## 2025-07-22 NOTE — ASSESSMENT & PLAN NOTE
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  CTAP showed 1.8 cm hyperenhancing lesion in the pancreatic neck concerning for neoplasm.  Of note, a 1.5 cm pancreatic lesion was seen on his CT scan on May 31 at [facility].  Patient was recommended EUS at that time but this was not performed.  He also has a family history of gastric cancer in aunt and liver cancer in mother.    He underwent an EUS 7/18/2025 however, the hyperenhancing lesion seen on CT scan was not clearly visualized.  There was a subtle 1.2 to 1.8 cm hyperechoic area in the pancreatic neck.  FNA of this area was obtained.  Given his inconclusive EUS findings and severe abdominal pain for procedure, an MRI was performed for further investigation and to delineate CT scan findings.  MRI/MRCP 7/20 showed 1.4 cm well-circumscribed hyperenhancing pancreatic nodule likely a neuroendocrine tumor.  There was also finding of a new 9 x 7.6 x 7.2 nonenhancing mass with fat stranding consistent with a hematoma abutting the pancreatic tail without active hemorrhage.    Suspect his postprocedural abdominal pain likely secondary to hematoma and inflammation likely pancreatitis.  Fortunately, his abdominal pain is much improved.  He was able to ambulate and tolerated diet.      Plan  He was febrile yesterday however, is without abdominal pain today and improvement in his leukocytosis.  He had low-grade temperatures overnight.  He is eager to go home and I believe he is otherwise appropriate for discharge from a GI perspective.  Okay to monitor off antibiotic status suspect fever could be secondary to pancreatitis versus hematoma.    Repeat CT abdomen and pelvis in 2 to 4 weeks to reassess hematoma  Will arrange for  outpatient follow-up with advanced endoscopy team.  If his FNA results are inconclusive, may likely need a repeat EUS  Follow up CEA, CA 19-9, and chromogranin A  Okay to advance to low fiber/low residue diet  Recommend MiraLAX +/- Dulcolax for bowel regimen while patient on opioid. He was able to have a bowel movement overnight  Recommend follow-up outpatient with surgical oncology.  Appreciate recommendations  Additional pain and symptom management per primary team

## 2025-07-22 NOTE — PLAN OF CARE
Problem: PAIN - ADULT  Goal: Verbalizes/displays adequate comfort level or baseline comfort level  Description: Interventions:  - Encourage patient to monitor pain and request assistance  - Assess pain using appropriate pain scale  - Administer analgesics as ordered based on type and severity of pain and evaluate response  - Implement non-pharmacological measures as appropriate and evaluate response  - Consider cultural and social influences on pain and pain management  - Notify physician/advanced practitioner if interventions unsuccessful or patient reports new pain  - Educate patient/family on pain management process including their role and importance of  reporting pain   - Provide non-pharmacologic/complimentary pain relief interventions  Outcome: Progressing     Problem: INFECTION - ADULT  Goal: Absence or prevention of progression during hospitalization  Description: INTERVENTIONS:  - Assess and monitor for signs and symptoms of infection  - Monitor lab/diagnostic results  - Monitor all insertion sites, i.e. indwelling lines, tubes, and drains  - Monitor endotracheal if appropriate and nasal secretions for changes in amount and color  - [place] appropriate cooling/warming therapies per order  - Administer medications as ordered  - Instruct and encourage patient and family to use good hand hygiene technique  - Identify and instruct in appropriate isolation precautions for identified infection/condition  Outcome: Progressing     Problem: SAFETY ADULT  Goal: Patient will remain free of falls  Description: INTERVENTIONS:  - Educate patient/family on patient safety including physical limitations  - Instruct patient to call for assistance with activity   - Consider consulting OT/PT to assist with strengthening/mobility based on AM PAC & JH-HLM score  - Consult OT/PT to assist with strengthening/mobility   - Keep Call bell within reach  - Keep bed low and locked with side rails adjusted as appropriate  - Keep  care items and personal belongings within reach  - Initiate and maintain comfort rounds  - Make Fall Risk Sign visible to staff  - Offer Toileting every 2 Hours, in advance of need  - Initiate/Maintain alarm  - Obtain necessary fall risk management equipment:   - Apply yellow socks and bracelet for high fall risk patients  - Consider moving patient to room near nurses station  Outcome: Progressing     Problem: DISCHARGE PLANNING  Goal: Discharge to home or other facility with appropriate resources  Description: INTERVENTIONS:  - Identify barriers to discharge w/patient and caregiver  - Arrange for needed discharge resources and transportation as appropriate  - Identify discharge learning needs (meds, wound care, etc.)  - Arrange for interpretive services to assist at discharge as needed  - Refer to Case Management Department for coordinating discharge planning if the patient needs post-hospital services based on physician/advanced practitioner order or complex needs related to functional status, cognitive ability, or social support system  Outcome: Progressing     Problem: Knowledge Deficit  Goal: Patient/family/caregiver demonstrates understanding of disease process, treatment plan, medications, and discharge instructions  Description: Complete learning assessment and assess knowledge base.  Interventions:  - Provide teaching at level of understanding  - Provide teaching via preferred learning methods  Outcome: Progressing     Problem: Nutrition/Hydration-ADULT  Goal: Nutrient/Hydration intake appropriate for improving, restoring or maintaining nutritional needs  Description: Monitor and assess patient's nutrition/hydration status for malnutrition. Collaborate with interdisciplinary team and initiate plan and interventions as ordered.  Monitor patient's weight and dietary intake as ordered or per policy. Utilize nutrition screening tool and intervene as necessary. Determine patient's food preferences and provide  high-protein, high-caloric foods as appropriate.     INTERVENTIONS:  - Monitor oral intake, urinary output, labs, and treatment plans  - Assess nutrition and hydration status and recommend course of action  - Evaluate amount of meals eaten  - Assist patient with eating if necessary   - Allow adequate time for meals  - Recommend/ encourage appropriate diets, oral nutritional supplements, and vitamin/mineral supplements  - Order, calculate, and assess calorie counts as needed  - Recommend, monitor, and adjust tube feedings and TPN/PPN based on assessed needs  - Assess need for intravenous fluids  - Provide specific nutrition/hydration education as appropriate  - Include patient/family/caregiver in decisions related to nutrition  Outcome: Progressing

## 2025-07-22 NOTE — ASSESSMENT & PLAN NOTE
Patient previously evaluated by gastroenterology at [facility]  He underwent an endoscopic ultrasound on 4/23/2025 which revealed a submucosal nodule in the gastric fundus. Gastric biopsies revealed severely chronic gastritis with no evidence of intestinal metaplasia or H. pylori. 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 Patient does have history of gastric cancer.   Endoscopic ultrasound performed this admission with normal-appearing stomach.

## 2025-07-22 NOTE — ASSESSMENT & PLAN NOTE
Lab Results   Component Value Date    CREATININE 0.83 07/22/2025    CREATININE 1.02 07/21/2025    CREATININE 1.69 (H) 07/20/2025   In the setting of dye exposure, poor PO intake/prerenal d/t abd pain/n/v and ARB use   Has since resolved with IV fluids  Continue to encourage p.o. intake

## 2025-07-22 NOTE — PLAN OF CARE
Problem: PAIN - ADULT  Goal: Verbalizes/displays adequate comfort level or baseline comfort level  Description: Interventions:  - Encourage patient to monitor pain and request assistance  - Assess pain using appropriate pain scale  - Administer analgesics as ordered based on type and severity of pain and evaluate response  - Implement non-pharmacological measures as appropriate and evaluate response  - Consider cultural and social influences on pain and pain management  - Notify physician/advanced practitioner if interventions unsuccessful or patient reports new pain  - Educate patient/family on pain management process including their role and importance of  reporting pain   - Provide non-pharmacologic/complimentary pain relief interventions  Outcome: Progressing     Problem: INFECTION - ADULT  Goal: Absence or prevention of progression during hospitalization  Description: INTERVENTIONS:  - Assess and monitor for signs and symptoms of infection  - Monitor lab/diagnostic results  - Monitor all insertion sites, i.e. indwelling lines, tubes, and drains  - Monitor endotracheal if appropriate and nasal secretions for changes in amount and color  - [place] appropriate cooling/warming therapies per order  - Administer medications as ordered  - Instruct and encourage patient and family to use good hand hygiene technique  - Identify and instruct in appropriate isolation precautions for identified infection/condition  Outcome: Progressing

## 2025-07-22 NOTE — DISCHARGE SUMMARY
Discharge Summary - Hospitalist   Name: Elliot Woods 55 y.o. male I MRN: [id number]  [facility] I Date of Admission: 7/17/2025   Date of Service: 7/22/2025 I Hospital Day: 4     Assessment & Plan  Pancreatic mass  Presented with abd pain and vomiting, lipase negative. Patient moved to the area recently but previously followed at [facility].  Noted to have abd pain, N/V for several months. 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  It does not appear during that EUS that any notes were made regarding any biopsies of any pancreatic masses and unclear if mass was known then. Subsequent CT A/P on 5/31/25 at [facility] which showed a 1.5 cm pancreatic head enhancing nodule suspicious for a neuroendocrine tumor at that time for which EUS and tissue sampling was recommended but wasn't yet performed  CT abdomen pelvis with contrast with a 1.8 cm hyperenhancing lesion in the pancreatic neck, raising concern for neoplasm.    GI consulted   S/p EUS 7/18 unfortunately unable to identify mass but noted subtle hypoechoic area at the neck of the pancreas which was biopsied   MRI of the abdomen with and without contrast 7/20/25- Corresponding to CT findings, there is a 1.4 cm well-circumscribed, hyperenhancing pancreatic nodule, likely neuroendocrine tumor. There is a new 9.0 x 7.6 x 7.2 cm nonenhancing mass abutting the pancreatic tail, consistent with hematoma, without active hemorrhage. There is also fat stranding surrounding the hematoma and pancreatic tail. Please correlate with laboratory findings for evidence of acute pancreatitis.  Per GI continue to monitor given hematoma- hgb stable, abdominal pain improving. no evidence of active bleeding  leukocytosis likely reactive, will observe off of antibiotics at this time.   Now tolerating full  diet  Appreciate GI recommendations  Gastric nodule  Patient previously evaluated by gastroenterology at [facility]  He underwent an endoscopic ultrasound on 4/23/2025 which revealed a submucosal nodule in the gastric fundus. Gastric biopsies revealed severely chronic gastritis with no evidence of intestinal metaplasia or H. pylori. 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 Patient does have history of gastric cancer.   Endoscopic ultrasound performed this admission with normal-appearing stomach.   Hyperlipidemia  T bili mildly elevated 1.4 continue to monitor  Hypertension  On PTA Norvasc   PTA on benacar- started losartan 50mg daily 7/19 and noted to have COLLIN today, will hold and monitor off of ARB  Avoid hypotension and nephrotoxic drugs   BP remains controlled  Type 2 diabetes mellitus (HCC)  Lab Results   Component Value Date    HGBA1C 7.5 (H) 07/18/2025       Recent Labs     07/21/25  1123 07/21/25  1627 07/22/25  0727 07/22/25  1109   POCGLU 150* 149* 213* 191*       Blood Sugar Average: Last 72 hrs:  (P) 177    Hold metformin   C/w SSI coverage   Hypoglycemic protocol  Class 3 severe obesity due to excess calories with serious comorbidity and body mass index (BMI) of 40.0 to 44.9 in adult  BMI 39.85  Affecting all facets of life  Encourage diet weight loss  COLLIN (acute kidney injury) (HCC)  Lab Results   Component Value Date    CREATININE 0.83 07/22/2025    CREATININE 1.02 07/21/2025    CREATININE 1.69 (H) 07/20/2025   In the setting of dye exposure, poor PO intake/prerenal d/t abd pain/n/v and ARB use   Has since resolved with IV fluids  Continue to encourage p.o. intake    Leukocytosis  Noted to be trending up: 13.45--> 17.23  Could be reactive in the setting of above   Noted to have temperature of 101.3 overnight  Suspect likely reactive  No obvious source of infection  WBC count currently stable at 17K  Blood  cultures obtained  Will monitor closely off antibiotics for now  Peripancreatic hematoma  As noted on MRI  Hemoglobin mildly decreased today to 11 from 12  Continue to monitor hemoglobin     Medical Problems       Resolved Problems  Date Reviewed: 7/21/2025   None       Discharging Physician / Practitioner: Shari Ordoñez DO  PCP: No primary care provider on file.  Admission Date:   Admission Orders (From admission, onward)       Ordered        07/18/25 0003  INPATIENT ADMISSION  Once                          Discharge Date: 07/22/25          Hospital Course:   Elliot Woods is a 55 y.o. male patient who originally presented to the hospital on 7/17/2025 due to past medical history of diabetes, hypertension, hyperlipidemia who presents to the hospital with ongoing karissa pain as well as nausea and vomiting symptoms.  Patient presented with increased intensity of pain symptoms.  Patient recently moved to this area improves were seen at [facility].  He had undergone a prior EUS in April 2023 with follow-up biopsies of the gastric fundus stomach nodule.  Which spindle cell tumor was noted.  A follow-up CT was performed on May 31 in [facility] which showed a 1.5 cm pancreatic head enhancing nodule suspicious for neuroendocrine tumor at that time for which a follow-up endoscopic ultrasound and tissue sampling was recommended.  Patient was lost to follow-up at that time and was admitted here for further investigation.  He is status post endoscopic ultrasound completed on July 18, 2025.  However the hyperenhancing lesion seen on CT imaging was not clearly visualized.  There was a subtle 1.2 to 1.8 cm hyperechoic area in the pancreatic neck.  Fine-needle aspiration of this area was obtained.  Given the inconclusive findings on EUS with associated severe abdominal pain an MRI was performed for further investigation.  Imaging performed on July 20 showed a 1.4 cm well-circumscribed hyperenhancing pancreatic  nodule likely a neuroendocrine tumor.  There was also a new 9 x 7.6 x 7.2 nonenhancing mass with fat stranding consistent with hematoma abutting the pancreatic tail without active bleed.  Etiology of postprocedure pain attributed to hematoma and inflammation and likely pancreatitis.  Fortunately his pain has dramatically improved.  Patient is recommended follow-up in the outpatient setting with surgical oncology.    He will require outpatient follow-up with surgical oncology/oncology    Discharge instructions/Information to patient and family:   See after visit summary for information provided to patient and family.      Provisions for Follow-Up Care:  See after visit summary for information related to follow-up care and any pertinent home health orders.      Mobility at time of Discharge:   Basic Mobility Inpatient Raw Score: 24  JH-HLM Goal: 8: Walk 250 feet or more  JH-HLM Achieved: 7: Walk 25 feet or more      Administrative Statements   Discharge Statement:  I have spent a total time of 85 minutes in caring for this patient on the day of the visit/encounter. >30 minutes of time was spent on: Impressions.    **Please Note: This note may have been constructed using a voice recognition system**

## 2025-07-22 NOTE — ASSESSMENT & PLAN NOTE
On PTA [facility]   PTA on benacar- started losartan 50mg daily 7/19 and noted to have COLLIN today, will hold and monitor off of ARB  Avoid hypotension and nephrotoxic drugs   BP remains controlled

## 2025-07-22 NOTE — ASSESSMENT & PLAN NOTE
Lab Results   Component Value Date    HGBA1C 7.5 (H) 07/18/2025       Recent Labs     07/21/25  1123 07/21/25  1627 07/22/25  0727 07/22/25  1109   POCGLU 150* 149* 213* 191*       Blood Sugar Average: Last 72 hrs:  (P) 177    Hold metformin   C/w SSI coverage   Hypoglycemic protocol

## 2025-07-22 NOTE — ASSESSMENT & PLAN NOTE
Patient previously evaluated by gastroenterology at [facility]  He underwent an endoscopic ultrasound on 4/23/2025 which revealed a submucosal nodule in the gastric fundus. Gastric biopsies revealed severely chronic gastritis with no evidence of intestinal metaplasia or H. pylori. 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 Patient does have history of gastric cancer.   Endoscopic ultrasound performed this admission with normal-appearing stomach.

## 2025-07-22 NOTE — ASSESSMENT & PLAN NOTE
Presented with abd pain and vomiting, lipase negative. Patient moved to the area recently but previously followed at [facility].  Noted to have abd pain, N/V for several months. 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  It does not appear during that EUS that any notes were made regarding any biopsies of any pancreatic masses and unclear if mass was known then. Subsequent CT A/P on 5/31/25 at [facility] which showed a 1.5 cm pancreatic head enhancing nodule suspicious for a neuroendocrine tumor at that time for which EUS and tissue sampling was recommended but wasn't yet performed  CT abdomen pelvis with contrast with a 1.8 cm hyperenhancing lesion in the pancreatic neck, raising concern for neoplasm.    GI consulted   S/p EUS 7/18 unfortunately unable to identify mass but noted subtle hypoechoic area at the neck of the pancreas which was biopsied   MRI of the abdomen with and without contrast 7/20/25- Corresponding to CT findings, there is a 1.4 cm well-circumscribed, hyperenhancing pancreatic nodule, likely neuroendocrine tumor. There is a new 9.0 x 7.6 x 7.2 cm nonenhancing mass abutting the pancreatic tail, consistent with hematoma, without active hemorrhage. There is also fat stranding surrounding the hematoma and pancreatic tail. Please correlate with laboratory findings for evidence of acute pancreatitis.  Per GI continue to monitor given hematoma- hgb stable, abdominal pain improving. no evidence of active bleeding  leukocytosis likely reactive, will observe off of antibiotics at this time.   Now tolerating full diet  Appreciate GI recommendations

## 2025-07-22 NOTE — ASSESSMENT & PLAN NOTE
Lab Results   Component Value Date    HGBA1C 7.5 (H) 07/18/2025       Recent Labs     07/21/25  0756 07/21/25  1123 07/21/25  1627 07/22/25  0727   POCGLU 140 150* 149* 213*       Blood Sugar Average: Last 72 hrs:  (P) [id number]    Hold metformin   C/w SSI coverage   Hypoglycemic protocol

## 2025-07-22 NOTE — TELEPHONE ENCOUNTER
Pt reached out to [facility] to inquire if he will be discharged today. He states he is feeling well and is ready to go home. Informed pt I would forward his message to the GI provider he saw 07/21. Informed pt he may discuss this with his inpatient nurse and/or charge nurse as well.

## 2025-07-22 NOTE — PROGRESS NOTES
Progress Note - Gastroenterology   Name: Elliot Woods 55 y.o. 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  CTAP showed 1.8 cm hyperenhancing lesion in the pancreatic neck concerning for neoplasm.  Of note, a 1.5 cm pancreatic lesion was seen on his CT scan on May 31 at [facility].  Patient was recommended EUS at that time but this was not performed.  He also has a family history of gastric cancer in aunt and liver cancer in mother.    He underwent an EUS 7/18/2025 however, the hyperenhancing lesion seen on CT scan was not clearly visualized.  There was a subtle 1.2 to 1.8 cm hyperechoic area in the pancreatic neck.  FNA of this area was obtained.  Given his inconclusive EUS findings and severe abdominal pain for procedure, an MRI was performed for further investigation and to delineate CT scan findings.  MRI/MRCP 7/20 showed 1.4 cm well-circumscribed hyperenhancing pancreatic nodule likely a neuroendocrine tumor.  There was also finding of a new 9 x 7.6 x 7.2 nonenhancing mass with fat stranding consistent with a hematoma abutting the pancreatic tail without active hemorrhage.    Suspect his postprocedural abdominal pain likely secondary to hematoma and inflammation likely pancreatitis.  Fortunately, his abdominal pain is much improved.  He was able to ambulate and tolerated diet.      Plan  He was febrile yesterday however, is without abdominal pain today and improvement in his leukocytosis.  He had low-grade temperatures overnight.  He is eager to go home and I believe he is otherwise appropriate  for discharge from a GI perspective.  Okay to monitor off antibiotic status suspect fever could be secondary to pancreatitis versus hematoma.    Repeat CT abdomen and pelvis in 2 to 4 weeks to reassess hematoma  Will arrange for outpatient follow-up with advanced endoscopy team.  If his FNA results are inconclusive, may likely need a repeat EUS  Follow up CEA, CA 19-9, and chromogranin A  Okay to advance to low fiber/low residue diet  Recommend MiraLAX +/- Dulcolax for bowel regimen while patient on opioid. He was able to have a bowel movement overnight  Recommend follow-up outpatient with surgical oncology.  Appreciate recommendations  Additional pain and symptom management per primary team    Gastric nodule  Patient previously evaluated by gastroenterology at [facility].  He underwent an endoscopic ultrasound on 4/23/2025 which revealed a submucosal nodule in the gastric fundus.  Gastric biopsies revealed severely chronic gastritis with no evidence of intestinal metaplasia or H. pylori.  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  Patient does have history of gastric cancer.  Endoscopic ultrasound performed this admission with normal-appearing stomach.  Peripancreatic hematoma  9 cm peripancreatic fluid collection consistent with a hematoma without active extravasation.  Hemoglobin stable and patient's abdominal pain much improved.    - This is likely a complication of his recent endoscopic procedure  - I recommend a CT abdomen outpatient in 3 to 4 weeks to assess stability of hematoma.        Subjective   No acute events overnight.    Objective :  Temp:  [98.6 °F (37 °C)-100.5 °F (38.1 °C)] 98.9 °F (37.2 °C)  HR:  [] 89  BP: (130-161)/(66-98) 137/80  Resp:  [16] 16  SpO2:  [88 %-100 %] 92 %  O2 Device: None (Room air)    Physical Exam  Vitals and nursing note reviewed.   Constitutional:       General: He is not in acute  distress.     Appearance: He is well-developed.   HENT:      Head: Normocephalic and atraumatic.     Eyes:      Conjunctiva/sclera: Conjunctivae normal.       Cardiovascular:      Rate and Rhythm: Normal rate and regular rhythm.      Heart sounds: No murmur heard.  Pulmonary:      Effort: Pulmonary effort is normal. No respiratory distress.      Breath sounds: Normal breath sounds.   Abdominal:      Palpations: Abdomen is soft.      Tenderness: There is no abdominal tenderness.     Musculoskeletal:         General: No swelling.      Cervical back: Neck supple.     Skin:     General: Skin is warm and dry.      Capillary Refill: Capillary refill takes less than 2 seconds.     Neurological:      Mental Status: He is alert.     Psychiatric:         Mood and Affect: Mood normal.           Lab Results: I have reviewed the following results:          Marti Mendez MD  Gastroenterology Fellow, PGY- 5  Available on [employer] Secure Chat  7/22/2025 9:36 AM

## 2025-07-23 LAB — CGA SERPL-MCNC: 146.2 NG/ML (ref 0–101.8)

## 2025-07-24 ENCOUNTER — TELEPHONE (OUTPATIENT)
Dept: GASTROENTEROLOGY | Facility: CLINIC | Age: 55
End: 2025-07-24

## 2025-07-24 NOTE — TELEPHONE ENCOUNTER
Diasy De La Rosa MD to Gastro Advanced Endoscopy    7/23/25  9:46 AM  Result Note  Patient notified of inconclusive results while inpatient.  Will need repeat EUS in 3-4 weeks to re-evaluate pancreatic abnormality seen on imaging

## 2025-07-26 LAB
BACTERIA BLD CULT: NORMAL
BACTERIA BLD CULT: NORMAL

## 2025-07-28 ENCOUNTER — PATIENT OUTREACH (OUTPATIENT)
Dept: HEMATOLOGY ONCOLOGY | Facility: CLINIC | Age: 55
End: 2025-07-28

## 2025-07-31 NOTE — TELEPHONE ENCOUNTER
When speaking with patient to schedule repeat EUS procedure he stated that he is still having pain on his left side. Said he is eating and moving his bowels like normal.

## 2025-07-31 NOTE — TELEPHONE ENCOUNTER
Procedure: EUS   Scheduled date of procedure (as of today): 8/8/25  Physician performing procedure: Dr. De La Rosa   Location of procedure: [place]   Instructions reviewed with patient by:  Estefanía acosta   Clearances:  Diabetic

## 2025-08-01 ENCOUNTER — PATIENT OUTREACH (OUTPATIENT)
Dept: HEMATOLOGY ONCOLOGY | Facility: CLINIC | Age: 55
End: 2025-08-01

## 2025-08-05 ENCOUNTER — TELEPHONE (OUTPATIENT)
Dept: CARDIAC SURGERY | Facility: CLINIC | Age: 55
End: 2025-08-05

## 2025-08-05 ENCOUNTER — APPOINTMENT (OUTPATIENT)
Dept: RADIOLOGY | Facility: HOSPITAL | Age: 55
End: 2025-08-05
Payer: COMMERCIAL

## 2025-08-06 ENCOUNTER — TELEPHONE (OUTPATIENT)
Age: 55
End: 2025-08-06

## 2025-08-06 ENCOUNTER — HOSPITAL ENCOUNTER (OUTPATIENT)
Dept: RADIOLOGY | Facility: HOSPITAL | Age: 55
Discharge: HOME | End: 2025-08-06

## 2025-08-06 ENCOUNTER — TELEPHONE (OUTPATIENT)
Dept: GASTROENTEROLOGY | Facility: CLINIC | Age: 55
End: 2025-08-06

## 2025-08-06 DIAGNOSIS — K86.89 PANCREATIC MASS: Primary | ICD-10-CM

## 2025-08-06 DIAGNOSIS — K86.89 PANCREATIC MASS: ICD-10-CM

## 2025-08-06 DIAGNOSIS — S36.209D: ICD-10-CM

## 2025-08-06 PROCEDURE — 74177 CT ABD & PELVIS W/CONTRAST: CPT

## 2025-08-06 RX ADMIN — IOHEXOL 75 ML: 350 INJECTION, SOLUTION INTRAVENOUS at 14:07

## 2025-08-07 ENCOUNTER — PATIENT OUTREACH (OUTPATIENT)
Dept: HEMATOLOGY ONCOLOGY | Facility: CLINIC | Age: 55
End: 2025-08-07

## 2025-08-07 ENCOUNTER — LAB REQUISITION (OUTPATIENT)
Dept: LAB | Facility: HOSPITAL | Age: 55
End: 2025-08-07

## 2025-08-08 ENCOUNTER — ANESTHESIA EVENT (OUTPATIENT)
Dept: GASTROENTEROLOGY | Facility: HOSPITAL | Age: 55
End: 2025-08-08
Payer: COMMERCIAL

## 2025-08-08 ENCOUNTER — HOSPITAL ENCOUNTER (OUTPATIENT)
Dept: GASTROENTEROLOGY | Facility: HOSPITAL | Age: 55
Setting detail: OUTPATIENT SURGERY
Discharge: HOME | End: 2025-08-08
Payer: COMMERCIAL

## 2025-08-08 ENCOUNTER — ANESTHESIA (OUTPATIENT)
Dept: GASTROENTEROLOGY | Facility: HOSPITAL | Age: 55
End: 2025-08-08
Payer: COMMERCIAL

## 2025-08-12 ENCOUNTER — OFFICE VISIT (OUTPATIENT)
Dept: SURGICAL ONCOLOGY | Facility: CLINIC | Age: 55
End: 2025-08-12

## 2025-08-13 ENCOUNTER — TELEPHONE (OUTPATIENT)
Dept: HEMATOLOGY ONCOLOGY | Facility: CLINIC | Age: 55
End: 2025-08-13

## 2025-08-15 ENCOUNTER — TELEPHONE (OUTPATIENT)
Dept: GASTROENTEROLOGY | Facility: CLINIC | Age: 55
End: 2025-08-15